# Patient Record
Sex: FEMALE | Race: WHITE | Employment: OTHER | URBAN - METROPOLITAN AREA
[De-identification: names, ages, dates, MRNs, and addresses within clinical notes are randomized per-mention and may not be internally consistent; named-entity substitution may affect disease eponyms.]

---

## 2017-01-20 RX ORDER — CALCITRIOL 0.25 UG/1
CAPSULE, LIQUID FILLED ORAL
Qty: 90 CAPSULE | Refills: 2 | Status: SHIPPED | OUTPATIENT
Start: 2017-01-20 | End: 2017-04-17 | Stop reason: SDUPTHER

## 2017-02-03 ENCOUNTER — HOSPITAL ENCOUNTER (OUTPATIENT)
Dept: MAMMOGRAPHY | Age: 42
Discharge: HOME | End: 2017-02-03
Attending: OBSTETRICS & GYNECOLOGY | Admitting: OBSTETRICS & GYNECOLOGY

## 2017-02-03 DIAGNOSIS — R92.8 ABNORMAL FINDING ON MAMMOGRAPHY: ICD-10-CM

## 2017-02-03 DIAGNOSIS — R92.2 INCONCLUSIVE MAMMOGRAM: ICD-10-CM

## 2017-03-10 RX ORDER — METOPROLOL SUCCINATE 50 MG/1
TABLET, EXTENDED RELEASE ORAL
Qty: 90 TABLET | Refills: 0 | Status: SHIPPED | OUTPATIENT
Start: 2017-03-10 | End: 2018-03-28 | Stop reason: SDUPTHER

## 2017-04-04 RX ORDER — VERAPAMIL HYDROCHLORIDE 300 MG/1
CAPSULE, EXTENDED RELEASE ORAL
Qty: 90 CAPSULE | Refills: 0 | Status: SHIPPED | OUTPATIENT
Start: 2017-04-04 | End: 2018-03-28

## 2017-04-21 RX ORDER — CALCITRIOL 0.25 UG/1
CAPSULE, LIQUID FILLED ORAL
Qty: 90 CAPSULE | Refills: 1 | Status: SHIPPED | OUTPATIENT
Start: 2017-04-21 | End: 2017-06-17 | Stop reason: SDUPTHER

## 2017-06-07 RX ORDER — METOPROLOL SUCCINATE 50 MG/1
TABLET, EXTENDED RELEASE ORAL
Qty: 90 TABLET | Refills: 0 | Status: SHIPPED | OUTPATIENT
Start: 2017-06-07 | End: 2018-03-28

## 2017-06-16 ENCOUNTER — TELEPHONE (OUTPATIENT)
Dept: FAMILY MEDICINE CLINIC | Age: 42
End: 2017-06-16

## 2017-06-16 ENCOUNTER — OFFICE VISIT (OUTPATIENT)
Dept: FAMILY MEDICINE CLINIC | Age: 42
End: 2017-06-16

## 2017-06-16 VITALS
WEIGHT: 183 LBS | HEART RATE: 78 BPM | BODY MASS INDEX: 31.41 KG/M2 | SYSTOLIC BLOOD PRESSURE: 123 MMHG | DIASTOLIC BLOOD PRESSURE: 83 MMHG | OXYGEN SATURATION: 96 % | TEMPERATURE: 97.4 F | RESPIRATION RATE: 16 BRPM

## 2017-06-16 DIAGNOSIS — K02.9 DENTAL CARIES: Primary | ICD-10-CM

## 2017-06-16 PROCEDURE — 99213 OFFICE O/P EST LOW 20 MIN: CPT | Performed by: FAMILY MEDICINE

## 2017-06-16 PROCEDURE — G8599 NO ASA/ANTIPLAT THER USE RNG: HCPCS | Performed by: FAMILY MEDICINE

## 2017-06-16 PROCEDURE — G8427 DOCREV CUR MEDS BY ELIG CLIN: HCPCS | Performed by: FAMILY MEDICINE

## 2017-06-16 PROCEDURE — 1036F TOBACCO NON-USER: CPT | Performed by: FAMILY MEDICINE

## 2017-06-16 PROCEDURE — G8419 CALC BMI OUT NRM PARAM NOF/U: HCPCS | Performed by: FAMILY MEDICINE

## 2017-06-16 RX ORDER — CLINDAMYCIN HYDROCHLORIDE 300 MG/1
300 CAPSULE ORAL 3 TIMES DAILY
Qty: 30 CAPSULE | Refills: 0 | Status: SHIPPED | OUTPATIENT
Start: 2017-06-16 | End: 2017-06-26

## 2017-06-19 RX ORDER — CALCITRIOL 0.25 UG/1
CAPSULE, LIQUID FILLED ORAL
Qty: 90 CAPSULE | Refills: 0 | Status: SHIPPED | OUTPATIENT
Start: 2017-06-19 | End: 2017-07-17 | Stop reason: SDUPTHER

## 2017-07-03 RX ORDER — VERAPAMIL HYDROCHLORIDE 300 MG/1
CAPSULE, EXTENDED RELEASE ORAL
Qty: 90 CAPSULE | Refills: 0 | Status: SHIPPED | OUTPATIENT
Start: 2017-07-03 | End: 2018-03-28

## 2017-07-17 RX ORDER — CALCITRIOL 0.25 UG/1
CAPSULE, LIQUID FILLED ORAL
Qty: 90 CAPSULE | Refills: 0 | Status: SHIPPED | OUTPATIENT
Start: 2017-07-17 | End: 2017-08-15 | Stop reason: SDUPTHER

## 2017-08-15 RX ORDER — CALCITRIOL 0.25 UG/1
CAPSULE, LIQUID FILLED ORAL
Qty: 90 CAPSULE | Refills: 0 | Status: SHIPPED | OUTPATIENT
Start: 2017-08-15 | End: 2017-09-13 | Stop reason: SDUPTHER

## 2017-08-17 DIAGNOSIS — E83.42 HYPOMAGNESEMIA: ICD-10-CM

## 2017-08-17 DIAGNOSIS — E83.51 HYPOCALCEMIA: ICD-10-CM

## 2017-08-17 DIAGNOSIS — E20.9 HYPOPARATHYROIDISM, UNSPECIFIED HYPOPARATHYROIDISM TYPE (HCC): ICD-10-CM

## 2017-08-17 LAB
CALCIUM SERPL-MCNC: 9.9 MG/DL (ref 8.3–10.6)
MAGNESIUM: 2 MG/DL (ref 1.8–2.4)

## 2017-09-05 RX ORDER — METOPROLOL SUCCINATE 50 MG/1
TABLET, EXTENDED RELEASE ORAL
Qty: 90 TABLET | Refills: 0 | Status: SHIPPED | OUTPATIENT
Start: 2017-09-05 | End: 2018-03-28

## 2017-09-13 RX ORDER — CALCITRIOL 0.25 UG/1
CAPSULE, LIQUID FILLED ORAL
Qty: 90 CAPSULE | Refills: 1 | Status: SHIPPED | OUTPATIENT
Start: 2017-09-13 | End: 2017-11-15 | Stop reason: SDUPTHER

## 2017-10-02 RX ORDER — VERAPAMIL HYDROCHLORIDE 300 MG/1
CAPSULE, EXTENDED RELEASE ORAL
Qty: 90 CAPSULE | Refills: 0 | Status: SHIPPED | OUTPATIENT
Start: 2017-10-02 | End: 2018-03-28

## 2017-11-09 ENCOUNTER — TELEPHONE (OUTPATIENT)
Dept: FAMILY MEDICINE CLINIC | Age: 42
End: 2017-11-09

## 2017-11-09 DIAGNOSIS — G91.1 OBSTRUCTIVE HYDROCEPHALUS (HCC): Primary | ICD-10-CM

## 2017-11-09 DIAGNOSIS — E20.9 HYPOPARATHYROIDISM, UNSPECIFIED HYPOPARATHYROIDISM TYPE (HCC): ICD-10-CM

## 2017-11-09 DIAGNOSIS — E83.51 HYPOCALCEMIA: ICD-10-CM

## 2017-11-10 DIAGNOSIS — E83.51 HYPOCALCEMIA: ICD-10-CM

## 2017-11-10 DIAGNOSIS — G91.1 OBSTRUCTIVE HYDROCEPHALUS (HCC): ICD-10-CM

## 2017-11-10 DIAGNOSIS — E20.9 HYPOPARATHYROIDISM, UNSPECIFIED HYPOPARATHYROIDISM TYPE (HCC): ICD-10-CM

## 2017-11-10 LAB
CALCIUM SERPL-MCNC: 6.9 MG/DL (ref 8.3–10.6)
MAGNESIUM: 1.7 MG/DL (ref 1.8–2.4)

## 2017-11-16 RX ORDER — CALCITRIOL 0.25 UG/1
CAPSULE, LIQUID FILLED ORAL
Qty: 90 CAPSULE | Refills: 0 | Status: SHIPPED | OUTPATIENT
Start: 2017-11-16 | End: 2017-12-15 | Stop reason: SDUPTHER

## 2017-12-08 RX ORDER — METOPROLOL SUCCINATE 50 MG/1
TABLET, EXTENDED RELEASE ORAL
Qty: 90 TABLET | Refills: 0 | Status: SHIPPED | OUTPATIENT
Start: 2017-12-08 | End: 2018-03-28

## 2017-12-15 RX ORDER — CALCITRIOL 0.25 UG/1
CAPSULE, LIQUID FILLED ORAL
Qty: 90 CAPSULE | Refills: 0 | Status: SHIPPED | OUTPATIENT
Start: 2017-12-15 | End: 2018-01-13 | Stop reason: SDUPTHER

## 2017-12-29 RX ORDER — VERAPAMIL HYDROCHLORIDE 300 MG/1
CAPSULE, EXTENDED RELEASE ORAL
Qty: 90 CAPSULE | Refills: 0 | Status: SHIPPED | OUTPATIENT
Start: 2017-12-29 | End: 2018-03-28

## 2018-01-15 RX ORDER — CALCITRIOL 0.25 UG/1
CAPSULE, LIQUID FILLED ORAL
Qty: 90 CAPSULE | Refills: 0 | Status: SHIPPED | OUTPATIENT
Start: 2018-01-15 | End: 2018-02-11 | Stop reason: SDUPTHER

## 2018-02-12 RX ORDER — CALCITRIOL 0.25 UG/1
CAPSULE, LIQUID FILLED ORAL
Qty: 90 CAPSULE | Refills: 0 | Status: SHIPPED | OUTPATIENT
Start: 2018-02-12 | End: 2018-03-15 | Stop reason: SDUPTHER

## 2018-03-06 RX ORDER — METOPROLOL SUCCINATE 50 MG/1
TABLET, EXTENDED RELEASE ORAL
Qty: 90 TABLET | Refills: 0 | Status: SHIPPED | OUTPATIENT
Start: 2018-03-06 | End: 2018-03-28

## 2018-03-15 RX ORDER — CALCITRIOL 0.25 UG/1
CAPSULE, LIQUID FILLED ORAL
Qty: 90 CAPSULE | Refills: 0 | Status: SHIPPED | OUTPATIENT
Start: 2018-03-15 | End: 2018-03-28

## 2018-03-28 ENCOUNTER — OFFICE VISIT (OUTPATIENT)
Dept: FAMILY MEDICINE CLINIC | Age: 43
End: 2018-03-28

## 2018-03-28 VITALS
WEIGHT: 192.6 LBS | HEIGHT: 65 IN | HEART RATE: 76 BPM | DIASTOLIC BLOOD PRESSURE: 83 MMHG | RESPIRATION RATE: 16 BRPM | SYSTOLIC BLOOD PRESSURE: 121 MMHG | BODY MASS INDEX: 32.09 KG/M2 | TEMPERATURE: 97.6 F | OXYGEN SATURATION: 95 %

## 2018-03-28 DIAGNOSIS — I67.1 CEREBRAL ANEURYSM: Primary | ICD-10-CM

## 2018-03-28 DIAGNOSIS — E83.42 HYPOMAGNESEMIA: ICD-10-CM

## 2018-03-28 DIAGNOSIS — I10 HYPERTENSION, ESSENTIAL, BENIGN: ICD-10-CM

## 2018-03-28 DIAGNOSIS — I63.9 CEREBRAL INFARCTION, UNSPECIFIED MECHANISM (HCC): ICD-10-CM

## 2018-03-28 DIAGNOSIS — E20.9 HYPOPARATHYROIDISM, UNSPECIFIED HYPOPARATHYROIDISM TYPE (HCC): ICD-10-CM

## 2018-03-28 DIAGNOSIS — I69.90 LATE EFFECTS OF CVA (CEREBROVASCULAR ACCIDENT): ICD-10-CM

## 2018-03-28 DIAGNOSIS — R47.01 EXPRESSIVE APHASIA: ICD-10-CM

## 2018-03-28 DIAGNOSIS — Z12.39 SCREENING FOR BREAST CANCER: ICD-10-CM

## 2018-03-28 LAB
A/G RATIO: 1.5 (ref 1.1–2.2)
ALBUMIN SERPL-MCNC: 4.5 G/DL (ref 3.4–5)
ALP BLD-CCNC: 72 U/L (ref 40–129)
ALT SERPL-CCNC: 9 U/L (ref 10–40)
ANION GAP SERPL CALCULATED.3IONS-SCNC: 18 MMOL/L (ref 3–16)
AST SERPL-CCNC: 9 U/L (ref 15–37)
BILIRUB SERPL-MCNC: 0.5 MG/DL (ref 0–1)
BUN BLDV-MCNC: 24 MG/DL (ref 7–20)
CALCIUM SERPL-MCNC: 8.1 MG/DL (ref 8.3–10.6)
CHLORIDE BLD-SCNC: 97 MMOL/L (ref 99–110)
CHOLESTEROL, TOTAL: 216 MG/DL (ref 0–199)
CO2: 27 MMOL/L (ref 21–32)
CREAT SERPL-MCNC: 1.2 MG/DL (ref 0.6–1.1)
GFR AFRICAN AMERICAN: 59
GFR NON-AFRICAN AMERICAN: 49
GLOBULIN: 3.1 G/DL
GLUCOSE BLD-MCNC: 79 MG/DL (ref 70–99)
HCT VFR BLD CALC: 43.1 % (ref 36–48)
HDLC SERPL-MCNC: 58 MG/DL (ref 40–60)
HEMOGLOBIN: 14.7 G/DL (ref 12–16)
LDL CHOLESTEROL CALCULATED: 123 MG/DL
MAGNESIUM: 1.9 MG/DL (ref 1.8–2.4)
MCH RBC QN AUTO: 29.5 PG (ref 26–34)
MCHC RBC AUTO-ENTMCNC: 34 G/DL (ref 31–36)
MCV RBC AUTO: 86.8 FL (ref 80–100)
PDW BLD-RTO: 13.3 % (ref 12.4–15.4)
PLATELET # BLD: 243 K/UL (ref 135–450)
PMV BLD AUTO: 10 FL (ref 5–10.5)
POTASSIUM SERPL-SCNC: 4.5 MMOL/L (ref 3.5–5.1)
RBC # BLD: 4.97 M/UL (ref 4–5.2)
SODIUM BLD-SCNC: 142 MMOL/L (ref 136–145)
TOTAL PROTEIN: 7.6 G/DL (ref 6.4–8.2)
TRIGL SERPL-MCNC: 173 MG/DL (ref 0–150)
TSH SERPL DL<=0.05 MIU/L-ACNC: 1.29 UIU/ML (ref 0.27–4.2)
VLDLC SERPL CALC-MCNC: 35 MG/DL
WBC # BLD: 8.6 K/UL (ref 4–11)

## 2018-03-28 PROCEDURE — 1036F TOBACCO NON-USER: CPT | Performed by: FAMILY MEDICINE

## 2018-03-28 PROCEDURE — G8427 DOCREV CUR MEDS BY ELIG CLIN: HCPCS | Performed by: FAMILY MEDICINE

## 2018-03-28 PROCEDURE — 99214 OFFICE O/P EST MOD 30 MIN: CPT | Performed by: FAMILY MEDICINE

## 2018-03-28 PROCEDURE — G8599 NO ASA/ANTIPLAT THER USE RNG: HCPCS | Performed by: FAMILY MEDICINE

## 2018-03-28 PROCEDURE — G8417 CALC BMI ABV UP PARAM F/U: HCPCS | Performed by: FAMILY MEDICINE

## 2018-03-28 PROCEDURE — G8484 FLU IMMUNIZE NO ADMIN: HCPCS | Performed by: FAMILY MEDICINE

## 2018-03-28 RX ORDER — LEVETIRACETAM 250 MG/1
250 TABLET ORAL DAILY
Qty: 30 TABLET | Refills: 5 | Status: SHIPPED | OUTPATIENT
Start: 2018-03-28 | End: 2018-04-28 | Stop reason: SDUPTHER

## 2018-03-28 RX ORDER — VERAPAMIL HYDROCHLORIDE 300 MG/1
CAPSULE, EXTENDED RELEASE ORAL
Qty: 90 CAPSULE | Refills: 1 | Status: SHIPPED | OUTPATIENT
Start: 2018-03-28 | End: 2018-09-25 | Stop reason: SDUPTHER

## 2018-03-28 RX ORDER — METOPROLOL SUCCINATE 50 MG/1
TABLET, EXTENDED RELEASE ORAL
Qty: 90 TABLET | Refills: 1 | Status: SHIPPED | OUTPATIENT
Start: 2018-03-28 | End: 2019-05-08 | Stop reason: SDUPTHER

## 2018-03-28 ASSESSMENT — PATIENT HEALTH QUESTIONNAIRE - PHQ9
SUM OF ALL RESPONSES TO PHQ QUESTIONS 1-9: 0
SUM OF ALL RESPONSES TO PHQ9 QUESTIONS 1 & 2: 0
1. LITTLE INTEREST OR PLEASURE IN DOING THINGS: 0
2. FEELING DOWN, DEPRESSED OR HOPELESS: 0

## 2018-03-28 NOTE — PROGRESS NOTES
Here for f/u, pt overall has been doing ok, things are stable. Everything is in the status quo per pt and . Pt is home during the day, was doing some adult  in the past but has not been doing that regularly. Pt is home, trying to stay active and doing some household chores and manages to stay busy. Does watch a lot of TV, and plays some games. Pt at this point is comfortable at home, and is not interested. She has been feeling well overall, no new issues or concerns at this time. Pt has not had any issues of chest pain, shortness of breath. No abd pain, nausea issues.  does watch her closely for calcium balance, and does get bloodwork regularly, does need bloodwork orders to have done. Appetite has been ok and energy ok. She has not seen dr. Verona Abbott recently, as she has 'graduated' and gets yearly contrast-enhanced CT scan. Last CT angiogram was 1 year ago. No new neurologic symptoms, continues on keppra and other meds and feels well. Except as noted above in the history of present illness, the review of systems is  negative for headache, vision changes, chest pain, shortness of breath, abdominal pain, urinary sx, bowel changes. Past medical, surgical, and social history reviewed and updated  Medications and allergies reviewed and updated         O: /83   Pulse 76   Temp 97.6 °F (36.4 °C)   Resp 16   Ht 5' 5\" (1.651 m)   Wt 192 lb 9.6 oz (87.4 kg)   SpO2 95%   Breastfeeding? No   BMI 32.05 kg/m²   GEN: No acute distress, cooperative, well nourished, alert. HEENT: PEERLA, EOMI , normocephalic/atraumatic, nares and oropharynx clear. Mucus membranes normal, Tympanic membranes clear bilaterally. Neck: soft, supple, no thyromegaly, mass, no Lymphadenopathy  CV: Regular rate and rhythm, no murmur, rubs, gallops. No edema. Resp: Clear to auscultation bilaterally good air entry bilaterally  No crackles, wheeze. Breathing comfortably.    Neuro: No focal Future    Quality & Risk Score Accuracy - MEDICARE ADVANTAGE    Last edited 03/28/18 20:09 EDT by Elsie Beasley MD            Follow-up appointment:   3 mos/pending bloodwork results/prn    Discussed use, benefit, and side effects of all prescribed medications. Barriers to medication compliance addressed. All patient questions answered. Pt voiced understanding. When applicable, patient's outside records were reviewed through [facility]. The patient has signed appropriate paperworks/consents. Dragon dictation software was used for parts of this progress note. All attempts were made to correct any errors and ensure accuracy.

## 2018-03-28 NOTE — LETTER
[facility]  [address] [address]  Phone: [phone]  Fax: [phone]    Andrea Gregorio MD        March 28, 2018     Patient: Nathanael Jeter   YOB: 1975   Date of Visit: 3/28/2018       To Whom It May Concern: It is my medical opinion that Yoana Frankel requires a disability parking placard for the following reasons:  She cannot walk without assistance from another person or the use of an assistance device (cane, crutch, prosthetic device, wheelchair, etc.). She has limited walking ability due to a neurologic condition. Duration of need: permanent    If you have any questions or concerns, please don't hesitate to call.     Sincerely,        Andrea Gregorio MD

## 2018-03-30 RX ORDER — VERAPAMIL HYDROCHLORIDE 300 MG/1
CAPSULE, EXTENDED RELEASE ORAL
Qty: 90 CAPSULE | Refills: 0 | Status: SHIPPED | OUTPATIENT
Start: 2018-03-30 | End: 2019-05-08 | Stop reason: SDUPTHER

## 2018-04-30 RX ORDER — LEVETIRACETAM 250 MG/1
250 TABLET ORAL DAILY
Qty: 90 TABLET | Refills: 2 | Status: SHIPPED | OUTPATIENT
Start: 2018-04-30 | End: 2019-02-08 | Stop reason: SDUPTHER

## 2018-05-18 RX ORDER — CALCITRIOL 0.25 UG/1
CAPSULE, LIQUID FILLED ORAL
Qty: 270 CAPSULE | Refills: 5 | Status: SHIPPED | OUTPATIENT
Start: 2018-05-18 | End: 2019-05-08 | Stop reason: SDUPTHER

## 2018-06-07 RX ORDER — METOPROLOL SUCCINATE 50 MG/1
TABLET, EXTENDED RELEASE ORAL
Qty: 90 TABLET | Refills: 0 | Status: SHIPPED | OUTPATIENT
Start: 2018-06-07 | End: 2019-05-08

## 2018-07-13 ENCOUNTER — TELEPHONE (OUTPATIENT)
Dept: FAMILY MEDICINE CLINIC | Age: 43
End: 2018-07-13

## 2018-09-14 ENCOUNTER — OFFICE VISIT (OUTPATIENT)
Dept: FAMILY MEDICINE CLINIC | Age: 43
End: 2018-09-14

## 2018-09-14 VITALS
RESPIRATION RATE: 14 BRPM | SYSTOLIC BLOOD PRESSURE: 122 MMHG | TEMPERATURE: 98.3 F | BODY MASS INDEX: 31.72 KG/M2 | DIASTOLIC BLOOD PRESSURE: 79 MMHG | HEART RATE: 73 BPM | OXYGEN SATURATION: 93 % | WEIGHT: 190.6 LBS

## 2018-09-14 DIAGNOSIS — E83.42 HYPOMAGNESEMIA: ICD-10-CM

## 2018-09-14 DIAGNOSIS — I10 HYPERTENSION, ESSENTIAL, BENIGN: ICD-10-CM

## 2018-09-14 DIAGNOSIS — R10.30 LOWER ABDOMINAL PAIN: ICD-10-CM

## 2018-09-14 DIAGNOSIS — I69.90 LATE EFFECTS OF CVA (CEREBROVASCULAR ACCIDENT): ICD-10-CM

## 2018-09-14 DIAGNOSIS — I63.9 CEREBRAL INFARCTION, UNSPECIFIED MECHANISM (HCC): Primary | ICD-10-CM

## 2018-09-14 LAB
A/G RATIO: 1.5 (ref 1.1–2.2)
ALBUMIN SERPL-MCNC: 4.5 G/DL (ref 3.4–5)
ALP BLD-CCNC: 62 U/L (ref 40–129)
ALT SERPL-CCNC: 8 U/L (ref 10–40)
ANION GAP SERPL CALCULATED.3IONS-SCNC: 18 MMOL/L (ref 3–16)
AST SERPL-CCNC: 10 U/L (ref 15–37)
BACTERIA: ABNORMAL /HPF
BILIRUB SERPL-MCNC: 0.3 MG/DL (ref 0–1)
BILIRUBIN URINE: NEGATIVE
BLOOD, URINE: ABNORMAL
BUN BLDV-MCNC: 15 MG/DL (ref 7–20)
CALCIUM SERPL-MCNC: 8.1 MG/DL (ref 8.3–10.6)
CHLORIDE BLD-SCNC: 100 MMOL/L (ref 99–110)
CLARITY: ABNORMAL
CO2: 25 MMOL/L (ref 21–32)
COLOR: YELLOW
CREAT SERPL-MCNC: 1 MG/DL (ref 0.6–1.1)
EPITHELIAL CELLS, UA: 1 /HPF (ref 0–5)
GFR AFRICAN AMERICAN: >60
GFR NON-AFRICAN AMERICAN: 60
GLOBULIN: 3 G/DL
GLUCOSE BLD-MCNC: 79 MG/DL (ref 70–99)
GLUCOSE URINE: NEGATIVE MG/DL
HCT VFR BLD CALC: 41.6 % (ref 36–48)
HEMOGLOBIN: 13.9 G/DL (ref 12–16)
HYALINE CASTS: 1 /LPF (ref 0–8)
KETONES, URINE: NEGATIVE MG/DL
LEUKOCYTE ESTERASE, URINE: ABNORMAL
MAGNESIUM: 1.9 MG/DL (ref 1.8–2.4)
MCH RBC QN AUTO: 29.5 PG (ref 26–34)
MCHC RBC AUTO-ENTMCNC: 33.5 G/DL (ref 31–36)
MCV RBC AUTO: 88.1 FL (ref 80–100)
MICROSCOPIC EXAMINATION: YES
NITRITE, URINE: POSITIVE
PDW BLD-RTO: 13.9 % (ref 12.4–15.4)
PH UA: 7
PLATELET # BLD: 233 K/UL (ref 135–450)
PMV BLD AUTO: 10.4 FL (ref 5–10.5)
POTASSIUM SERPL-SCNC: 4.2 MMOL/L (ref 3.5–5.1)
PROTEIN UA: NEGATIVE MG/DL
RBC # BLD: 4.72 M/UL (ref 4–5.2)
RBC UA: 1 /HPF (ref 0–4)
SODIUM BLD-SCNC: 143 MMOL/L (ref 136–145)
SPECIFIC GRAVITY UA: 1.01
TOTAL PROTEIN: 7.5 G/DL (ref 6.4–8.2)
TSH SERPL DL<=0.05 MIU/L-ACNC: 1.03 UIU/ML (ref 0.27–4.2)
URINE TYPE: ABNORMAL
UROBILINOGEN, URINE: 0.2 E.U./DL
WBC # BLD: 8 K/UL (ref 4–11)
WBC UA: 228 /HPF (ref 0–5)

## 2018-09-14 PROCEDURE — G8427 DOCREV CUR MEDS BY ELIG CLIN: HCPCS | Performed by: FAMILY MEDICINE

## 2018-09-14 PROCEDURE — G8417 CALC BMI ABV UP PARAM F/U: HCPCS | Performed by: FAMILY MEDICINE

## 2018-09-14 PROCEDURE — G8599 NO ASA/ANTIPLAT THER USE RNG: HCPCS | Performed by: FAMILY MEDICINE

## 2018-09-14 PROCEDURE — 1036F TOBACCO NON-USER: CPT | Performed by: FAMILY MEDICINE

## 2018-09-14 PROCEDURE — 99214 OFFICE O/P EST MOD 30 MIN: CPT | Performed by: FAMILY MEDICINE

## 2018-09-14 NOTE — PROGRESS NOTES
Here for a follow up and recheck of her blood pressure and some issues or concerns of some abd 'weird' feeling. Pt does not have any symptoms currently seems to come and go but feels 'off'. She has been complaining of some intermittent sx to her  a few days ago. No true pain but she claims it feels 'weird'. Not having any urinary sx, no frequency, no dysuria. Pt has had chronic bowel and bladder issues related to her prior CVA, but has not had any issues with nocturia. Does get prompted to go to the bathroom every 2 hrs on average. No blood in bowels, no c/o dysuria. Pt does not have any cycles since her D+C/ablation 4 yrs ago. No nausea/vomiting. No fevers    Pt here for follow up of blood pressure. Pt states doing great with adherence to therapy and feels well. No issues of chest pain, shortness of breath. No vision changes, headache, swelling in legs. Pt was going to adult  but not recently due to cost and transportation issues. Is at the house most of the day, will stay active folding clothes and doing chores, watching TV. Except as noted above in the history of present illness, the review of systems is  negative for headache, vision changes, chest pain, shortness of breath, abdominal pain, urinary sx, bowel changes. Past medical, surgical, and social history reviewed and updated  Medications and allergies reviewed and updated         O: /79   Pulse 73   Temp 98.3 °F (36.8 °C) (Oral)   Resp 14   Wt 190 lb 9.6 oz (86.5 kg)   SpO2 93%   BMI 31.72 kg/m²   GEN: No acute distress, cooperative, well nourished, alert. HEENT: PEERLA, EOMI , normocephalic/atraumatic, nares and oropharynx clear. Mucus membranes normal, Tympanic membranes clear bilaterally. Neck: soft, supple, no thyromegaly, mass, no Lymphadenopathy  CV: Regular rate and rhythm, no murmur, rubs, gallops. No edema.   Resp: Clear to auscultation bilaterally good air entry bilaterally  No crackles, wheeze. Breathing comfortably. Abd: soft, nontender. normoactive bowels sounds. No hepatosplenomegaly  No masses  Ext: no clubbing, cyanosis, edema  Psych: mood stable, flattened affect. No suicidal thoughts or ideation       Current Outpatient Prescriptions   Medication Sig Dispense Refill    MAGNESIUM-OXIDE 400 (241.3 Mg) MG TABS tablet TAKE 1 TABLET BY MOUTH TWICE DAILY. FOLLOW UP APPOINTMENT 180 tablet 0    levETIRAcetam (KEPPRA) 250 MG tablet TAKE 1 TABLET BY MOUTH DAILY 90 tablet 2    metoprolol succinate (TOPROL XL) 50 MG extended release tablet TAKE ONE TABLET BY MOUTH EVERY DAY 90 tablet 1    Verapamil HCl  MG CP24 TAKE 1 CAPSULE BY MOUTH EVERY DAY 90 capsule 1    calcitRIOL (ROCALTROL) 0.25 MCG capsule TAKE ONE CAPSULE BY MOUTH EVERY MORNING AND TWO CAPSULES EVERY EVENING 90 capsule 0    calcium carbonate 600 MG TABS tablet Take 1 tablet by mouth daily.  sulfamethoxazole-trimethoprim (BACTRIM DS) 800-160 MG per tablet Take 1 tablet by mouth 2 times daily for 7 days 14 tablet 0    metoprolol succinate (TOPROL XL) 50 MG extended release tablet TAKE ONE TABLET BY MOUTH EVERY DAY 90 tablet 0    calcitRIOL (ROCALTROL) 0.25 MCG capsule TAKE ONE CAPSULE BY MOUTH EVERY MORNING AND TWO CAPSULES EVERY EVENING 270 capsule 5    Verapamil HCl  MG CP24 TAKE 1 CAPSULE BY MOUTH EVERY DAY 90 capsule 0     No current facility-administered medications for this visit. ASSESSMENT / PLAN:    1. Cerebral infarction, unspecified mechanism ([facility] [address]  Chronic asymptomatic  Cont max medical therapy and will monitor, f/u with neurosurgery    2. Hypomagnesemia  Cont supplementation and check bloodwork as below    3. Late effects of CVA (cerebrovascular accident)  As above    4. Hypertension, essential, benign  . Blood pressure stable @ goal controlled  - CBC; Future  - Comprehensive Metabolic Panel; Future  - MAGNESIUM; Future  - TSH without Reflex; Future    5.  Lower abdominal pain  Exam nonfocal  Suspect UTI, unable to give sample  Check through lab  Management pending results. - Urinalysis; Future           Follow-up appointment:   Pending bloodwork results    Discussed use, benefit, and side effects of all prescribed medications. Barriers to medication compliance addressed. All patient questions answered. Pt voiced understanding. When applicable, patient's outside records were reviewed through [address] [place] [address]. The patient has signed appropriate paperworks/consents. Dragon dictation software was used for parts of this progress note. All attempts were made to correct any errors and ensure accuracy.

## 2018-09-15 RX ORDER — SULFAMETHOXAZOLE AND TRIMETHOPRIM 800; 160 MG/1; MG/1
1 TABLET ORAL 2 TIMES DAILY
Qty: 14 TABLET | Refills: 0 | Status: SHIPPED | OUTPATIENT
Start: 2018-09-15 | End: 2018-09-22

## 2018-09-25 RX ORDER — VERAPAMIL HYDROCHLORIDE 300 MG/1
CAPSULE, EXTENDED RELEASE ORAL
Qty: 90 CAPSULE | Refills: 0 | Status: SHIPPED | OUTPATIENT
Start: 2018-09-25 | End: 2018-12-24 | Stop reason: SDUPTHER

## 2018-11-30 RX ORDER — METOPROLOL SUCCINATE 50 MG/1
TABLET, EXTENDED RELEASE ORAL
Qty: 90 TABLET | Refills: 1 | Status: SHIPPED | OUTPATIENT
Start: 2018-11-30 | End: 2019-05-08

## 2018-12-07 DIAGNOSIS — N39.0 ACUTE UTI: ICD-10-CM

## 2018-12-07 DIAGNOSIS — E20.9 HYPOPARATHYROIDISM, UNSPECIFIED HYPOPARATHYROIDISM TYPE (HCC): ICD-10-CM

## 2018-12-07 LAB
CALCIUM SERPL-MCNC: 11.8 MG/DL (ref 8.3–10.6)
MAGNESIUM: 2.2 MG/DL (ref 1.8–2.4)

## 2018-12-10 LAB
BACTERIA: ABNORMAL /HPF
BILIRUBIN URINE: NEGATIVE
BLOOD, URINE: NEGATIVE
CLARITY: ABNORMAL
COLOR: YELLOW
EPITHELIAL CELLS, UA: ABNORMAL /HPF
GLUCOSE URINE: NEGATIVE MG/DL
KETONES, URINE: NEGATIVE MG/DL
LEUKOCYTE ESTERASE, URINE: ABNORMAL
MICROSCOPIC EXAMINATION: YES
NITRITE, URINE: NEGATIVE
PH UA: 6
PROTEIN UA: NEGATIVE MG/DL
RBC UA: ABNORMAL /HPF (ref 0–2)
SPECIFIC GRAVITY UA: 1.01
URINE TYPE: ABNORMAL
UROBILINOGEN, URINE: 0.2 E.U./DL
WBC UA: ABNORMAL /HPF (ref 0–5)

## 2018-12-12 LAB
ORGANISM: ABNORMAL
URINE CULTURE, ROUTINE: ABNORMAL
URINE CULTURE, ROUTINE: ABNORMAL

## 2018-12-14 RX ORDER — NITROFURANTOIN 25; 75 MG/1; MG/1
100 CAPSULE ORAL 2 TIMES DAILY
Qty: 14 CAPSULE | Refills: 0 | Status: SHIPPED | OUTPATIENT
Start: 2018-12-14 | End: 2018-12-21

## 2018-12-24 RX ORDER — VERAPAMIL HYDROCHLORIDE 300 MG/1
CAPSULE, EXTENDED RELEASE ORAL
Qty: 90 CAPSULE | Refills: 0 | Status: SHIPPED | OUTPATIENT
Start: 2018-12-24 | End: 2019-03-23 | Stop reason: SDUPTHER

## 2019-01-02 DIAGNOSIS — E20.9 HYPOPARATHYROIDISM, UNSPECIFIED HYPOPARATHYROIDISM TYPE (HCC): ICD-10-CM

## 2019-01-02 LAB
CALCIUM SERPL-MCNC: 8.4 MG/DL (ref 8.3–10.6)
MAGNESIUM: 1.7 MG/DL (ref 1.8–2.4)

## 2019-02-08 RX ORDER — LEVETIRACETAM 250 MG/1
250 TABLET ORAL DAILY
Qty: 90 TABLET | Refills: 0 | Status: SHIPPED | OUTPATIENT
Start: 2019-02-08 | End: 2019-05-08 | Stop reason: SDUPTHER

## 2019-03-25 RX ORDER — VERAPAMIL HYDROCHLORIDE 300 MG/1
CAPSULE, EXTENDED RELEASE ORAL
Qty: 90 CAPSULE | Refills: 1 | Status: SHIPPED | OUTPATIENT
Start: 2019-03-25 | End: 2019-09-12 | Stop reason: SDUPTHER

## 2019-05-08 ENCOUNTER — HOSPITAL ENCOUNTER (OUTPATIENT)
Dept: GENERAL RADIOLOGY | Age: 44
Discharge: HOME | End: 2019-05-08
Payer: COMMERCIAL

## 2019-05-08 ENCOUNTER — OFFICE VISIT (OUTPATIENT)
Dept: FAMILY MEDICINE CLINIC | Age: 44
End: 2019-05-08
Payer: COMMERCIAL

## 2019-05-08 ENCOUNTER — HOSPITAL ENCOUNTER (OUTPATIENT)
Age: 44
Discharge: HOME | End: 2019-05-08
Payer: COMMERCIAL

## 2019-05-08 VITALS
WEIGHT: 180.5 LBS | RESPIRATION RATE: 20 BRPM | OXYGEN SATURATION: 95 % | SYSTOLIC BLOOD PRESSURE: 128 MMHG | DIASTOLIC BLOOD PRESSURE: 86 MMHG | BODY MASS INDEX: 30.04 KG/M2 | TEMPERATURE: 98.7 F | HEART RATE: 63 BPM

## 2019-05-08 DIAGNOSIS — I69.90 LATE EFFECTS OF CVA (CEREBROVASCULAR ACCIDENT): ICD-10-CM

## 2019-05-08 DIAGNOSIS — E20.9 HYPOPARATHYROIDISM, UNSPECIFIED HYPOPARATHYROIDISM TYPE (HCC): ICD-10-CM

## 2019-05-08 DIAGNOSIS — I10 HYPERTENSION, ESSENTIAL, BENIGN: ICD-10-CM

## 2019-05-08 DIAGNOSIS — I63.9 CEREBRAL INFARCTION, UNSPECIFIED MECHANISM (HCC): Primary | ICD-10-CM

## 2019-05-08 DIAGNOSIS — E83.42 HYPOMAGNESEMIA: ICD-10-CM

## 2019-05-08 DIAGNOSIS — E83.51 HYPOCALCEMIA: ICD-10-CM

## 2019-05-08 DIAGNOSIS — R47.9 SPEECH DEFECT: ICD-10-CM

## 2019-05-08 DIAGNOSIS — M25.571 ACUTE RIGHT ANKLE PAIN: ICD-10-CM

## 2019-05-08 DIAGNOSIS — R41.844 EXECUTIVE FUNCTION DEFICIT: ICD-10-CM

## 2019-05-08 LAB
A/G RATIO: 1.3 (ref 1.1–2.2)
ALBUMIN SERPL-MCNC: 4.4 G/DL (ref 3.4–5)
ALP BLD-CCNC: 73 U/L (ref 40–129)
ALT SERPL-CCNC: 7 U/L (ref 10–40)
ANION GAP SERPL CALCULATED.3IONS-SCNC: 14 MMOL/L (ref 3–16)
AST SERPL-CCNC: 8 U/L (ref 15–37)
BILIRUB SERPL-MCNC: 0.5 MG/DL (ref 0–1)
BUN BLDV-MCNC: 24 MG/DL (ref 7–20)
CALCIUM SERPL-MCNC: 8 MG/DL (ref 8.3–10.6)
CHLORIDE BLD-SCNC: 102 MMOL/L (ref 99–110)
CHOLESTEROL, TOTAL: 217 MG/DL (ref 0–199)
CO2: 25 MMOL/L (ref 21–32)
CREAT SERPL-MCNC: 1.2 MG/DL (ref 0.6–1.1)
GFR AFRICAN AMERICAN: 59
GFR NON-AFRICAN AMERICAN: 48
GLOBULIN: 3.4 G/DL
GLUCOSE BLD-MCNC: 94 MG/DL (ref 70–99)
HCT VFR BLD CALC: 43.4 % (ref 36–48)
HDLC SERPL-MCNC: 51 MG/DL (ref 40–60)
HEMOGLOBIN: 14.5 G/DL (ref 12–16)
LDL CHOLESTEROL CALCULATED: 144 MG/DL
MAGNESIUM: 1.9 MG/DL (ref 1.8–2.4)
MCH RBC QN AUTO: 29.6 PG (ref 26–34)
MCHC RBC AUTO-ENTMCNC: 33.4 G/DL (ref 31–36)
MCV RBC AUTO: 88.7 FL (ref 80–100)
PDW BLD-RTO: 14.1 % (ref 12.4–15.4)
PLATELET # BLD: 251 K/UL (ref 135–450)
PMV BLD AUTO: 10.2 FL (ref 5–10.5)
POTASSIUM SERPL-SCNC: 4.2 MMOL/L (ref 3.5–5.1)
RBC # BLD: 4.89 M/UL (ref 4–5.2)
SODIUM BLD-SCNC: 141 MMOL/L (ref 136–145)
TOTAL PROTEIN: 7.8 G/DL (ref 6.4–8.2)
TRIGL SERPL-MCNC: 110 MG/DL (ref 0–150)
TSH SERPL DL<=0.05 MIU/L-ACNC: 0.96 UIU/ML (ref 0.27–4.2)
VLDLC SERPL CALC-MCNC: 22 MG/DL
WBC # BLD: 7.3 K/UL (ref 4–11)

## 2019-05-08 PROCEDURE — 73610 X-RAY EXAM OF ANKLE: CPT

## 2019-05-08 PROCEDURE — G8599 NO ASA/ANTIPLAT THER USE RNG: HCPCS | Performed by: FAMILY MEDICINE

## 2019-05-08 PROCEDURE — G8427 DOCREV CUR MEDS BY ELIG CLIN: HCPCS | Performed by: FAMILY MEDICINE

## 2019-05-08 PROCEDURE — 1036F TOBACCO NON-USER: CPT | Performed by: FAMILY MEDICINE

## 2019-05-08 PROCEDURE — G8417 CALC BMI ABV UP PARAM F/U: HCPCS | Performed by: FAMILY MEDICINE

## 2019-05-08 PROCEDURE — 99214 OFFICE O/P EST MOD 30 MIN: CPT | Performed by: FAMILY MEDICINE

## 2019-05-08 RX ORDER — LEVETIRACETAM 250 MG/1
250 TABLET ORAL DAILY
Qty: 90 TABLET | Refills: 0 | Status: SHIPPED | OUTPATIENT
Start: 2019-05-08 | End: 2019-08-03 | Stop reason: SDUPTHER

## 2019-05-08 RX ORDER — VERAPAMIL HYDROCHLORIDE 300 MG/1
CAPSULE, EXTENDED RELEASE ORAL
Qty: 90 CAPSULE | Refills: 1 | Status: SHIPPED | OUTPATIENT
Start: 2019-05-08 | End: 2019-09-12

## 2019-05-08 RX ORDER — CALCITRIOL 0.25 UG/1
CAPSULE, LIQUID FILLED ORAL
Qty: 270 CAPSULE | Refills: 1 | Status: SHIPPED | OUTPATIENT
Start: 2019-05-08 | End: 2019-10-15 | Stop reason: SDUPTHER

## 2019-05-08 RX ORDER — METOPROLOL SUCCINATE 50 MG/1
TABLET, EXTENDED RELEASE ORAL
Qty: 90 TABLET | Refills: 1 | Status: SHIPPED | OUTPATIENT
Start: 2019-05-08 | End: 2019-10-15 | Stop reason: SDUPTHER

## 2019-05-08 NOTE — PROGRESS NOTES
Here for f/u and recheck of mood, fuctional status, blood pressure, s/p CVA function    Pt is spending most of the time at home, and does spend a lot of time playing games on facebook. Pt does some laundry and some housework. Pt spends most times at home, and  is at work. Pt feels ok during the day at home,  does not have any sig concerns about her being there by herself. Pt has struggled to find some support for her disabilities. She was doing some day programs in the past, but was hard on  based on her schedule and they have not done that regularly. Pt does see neurosurgery regularly at  with dr. Clau Quinn, does imaging through [facility]. Last CT scan was 1/2017. The goal was to have them yearly but hopefully has 'graduated' to a 5 year plan. No issues of chest pain, shortness of breath. No abd pain, shortness of breath. Pt has noted some pain to R foot. Does not have any swelling . Pt thinks she may have injured but doesn't necessarily remember. The pain is in the R foot only. Pt here for follow up of blood pressure. Pt states doing great with adherence to therapy and feels well. No issues of chest pain, shortness of breath. No vision changes, headache, swelling in legs. Except as noted above in the history of present illness, the review of systems is  negative for headache, vision changes, chest pain, shortness of breath, abdominal pain, urinary sx, bowel changes. Past medical, surgical, and social history reviewed and updated  Medications and allergies reviewed and updated         O: /86   Pulse 63   Temp 98.7 °F (37.1 °C) (Oral)   Resp 20   Wt 180 lb 8 oz (81.9 kg)   SpO2 95%   BMI 30.04 kg/m²   GEN: No acute distress, cooperative, well nourished, alert. HEENT: PEERLA, EOMI , normocephalic/atraumatic, nares and oropharynx clear. Mucous membranes normal, Tympanic membranes clear bilaterally.     Neck: soft, supple, no thyromegaly, mass, no Lymphadenopathy  CV: Regular rate and rhythm, no murmur, rubs, gallops. No edema. Resp: Clear to auscultation bilaterally good air entry bilaterally  No crackles, wheeze. Breathing comfortably. Psych: mood stable, answering some questions. Denies depression        Current Outpatient Medications   Medication Sig Dispense Refill    magnesium oxide (MAGNESIUM-OXIDE) 400 (241.3 Mg) MG TABS tablet TAKE 1 TABLET BY MOUTH TWICE DAILY 180 tablet 1    levETIRAcetam (KEPPRA) 250 MG tablet Take 1 tablet by mouth daily 90 tablet 0    calcitRIOL (ROCALTROL) 0.25 MCG capsule TAKE ONE CAPSULE BY MOUTH EVERY MORNING AND TWO CAPSULES EVERY EVENING 270 capsule 1    Verapamil HCl  MG CP24 TAKE 1 CAPSULE BY MOUTH EVERY DAY 90 capsule 1    metoprolol succinate (TOPROL XL) 50 MG extended release tablet TAKE ONE TABLET BY MOUTH EVERY DAY 90 tablet 1    calcium carbonate 600 MG TABS tablet Take 1 tablet by mouth daily.  Verapamil HCl  MG CP24 TAKE 1 CAPSULE BY MOUTH EVERY DAY 90 capsule 1     No current facility-administered medications for this visit. ASSESSMENT / PLAN:    1. Cerebral infarction, unspecified mechanism ([address]  S/p aneurysm  Asymptomatic, cont monitoring  Cont f/u with neurosurgery and f/u ASAP for new neurologic sx    2. Hypoparathyroidism, unspecified hypoparathyroidism type ([facility] [address]  Cont monitoring of calcium level  - CBC; Future  - Comprehensive Metabolic Panel; Future    3. Hypomagnesemia  - MAGNESIUM; Future    4. Late effects of CVA (cerebrovascular accident)  Stable  Encouraged pt take part in activities as tolerated, and encouraged  to try and get out as much as possible    5. Speech defect  Stable s/p CVA    6. Executive function deficit  Stable w/o progressive sx    7. Hypocalcemia  - Comprehensive Metabolic Panel; Future    8. Hypertension, essential, benign  blood pressure stable @ goal, check bloodwork for:  - Lipid Panel; Future  - TSH without Reflex;  Future  - Hemoglobin A1C; Future    9. Acute right ankle pain  Exam nonfocal, c/o minimal lateral ankle pain  Check xray  Management pending results. - XR ANKLE RIGHT (MIN 3 VIEWS); Future           Follow-up appointment:   Pending xray results/bloodwork results    Discussed use, benefit, and side effects of all prescribed medications. Barriers to medication compliance addressed. All patient questions answered. Pt voiced understanding. When applicable, patient's outside records were reviewed through [facility]. The patient has signed appropriate paperworks/consents.

## 2019-05-09 LAB
ESTIMATED AVERAGE GLUCOSE: 134.1 MG/DL
HBA1C MFR BLD: 6.3 %

## 2019-07-29 ENCOUNTER — HOSPITAL ENCOUNTER (OUTPATIENT)
Dept: CT IMAGING | Age: 44
Discharge: HOME | End: 2019-07-29
Payer: COMMERCIAL

## 2019-07-29 DIAGNOSIS — I67.1 CEREBRAL ANEURYSM, NONRUPTURED: ICD-10-CM

## 2019-07-29 PROCEDURE — 6360000004 HC RX CONTRAST MEDICATION: Performed by: NEUROLOGICAL SURGERY

## 2019-07-29 PROCEDURE — 70496 CT ANGIOGRAPHY HEAD: CPT

## 2019-07-29 RX ADMIN — IOPAMIDOL 80 ML: 755 INJECTION, SOLUTION INTRAVENOUS at 10:07

## 2019-08-05 RX ORDER — LEVETIRACETAM 250 MG/1
250 TABLET ORAL DAILY
Qty: 90 TABLET | Refills: 0 | Status: SHIPPED | OUTPATIENT
Start: 2019-08-05 | End: 2020-05-11

## 2019-09-12 RX ORDER — VERAPAMIL HYDROCHLORIDE 300 MG/1
CAPSULE, EXTENDED RELEASE ORAL
Qty: 90 CAPSULE | Refills: 1 | Status: SHIPPED | OUTPATIENT
Start: 2019-09-12 | End: 2020-02-17

## 2019-10-15 RX ORDER — METOPROLOL SUCCINATE 50 MG/1
TABLET, EXTENDED RELEASE ORAL
Qty: 90 TABLET | Refills: 1 | Status: SHIPPED | OUTPATIENT
Start: 2019-10-15 | End: 2020-03-20

## 2019-10-15 RX ORDER — CALCITRIOL 0.25 UG/1
CAPSULE, LIQUID FILLED ORAL
Qty: 270 CAPSULE | Refills: 1 | Status: SHIPPED | OUTPATIENT
Start: 2019-10-15 | End: 2020-03-20

## 2019-10-31 RX ORDER — CALCITRIOL 0.25 UG/1
CAPSULE, LIQUID FILLED ORAL
Qty: 270 CAPSULE | Refills: 0 | Status: SHIPPED | OUTPATIENT
Start: 2019-10-31 | End: 2021-06-03 | Stop reason: SDUPTHER

## 2019-10-31 RX ORDER — METOPROLOL SUCCINATE 50 MG/1
TABLET, EXTENDED RELEASE ORAL
Qty: 90 TABLET | Refills: 0 | Status: SHIPPED | OUTPATIENT
Start: 2019-10-31 | End: 2021-06-03 | Stop reason: SDUPTHER

## 2020-02-17 RX ORDER — VERAPAMIL HYDROCHLORIDE 300 MG/1
CAPSULE, EXTENDED RELEASE ORAL
Qty: 90 CAPSULE | Refills: 4 | Status: SHIPPED | OUTPATIENT
Start: 2020-02-17 | End: 2020-09-18 | Stop reason: SDUPTHER

## 2020-02-17 NOTE — TELEPHONE ENCOUNTER
Requested Prescriptions     Pending Prescriptions Disp Refills    Verapamil HCl  MG CP24 [Pharmacy Med Name: VERAPAMIL HCL ER PM CAPS 300MG] 90 capsule 4     Sig: TAKE 1 CAPSULE DAILY     Last ov 5/8/19  Last labs 5/8/19

## 2020-03-20 RX ORDER — CALCITRIOL 0.25 UG/1
CAPSULE, LIQUID FILLED ORAL
Qty: 270 CAPSULE | Refills: 1 | Status: SHIPPED | OUTPATIENT
Start: 2020-03-20 | End: 2020-09-16

## 2020-03-20 RX ORDER — METOPROLOL SUCCINATE 50 MG/1
TABLET, EXTENDED RELEASE ORAL
Qty: 90 TABLET | Refills: 1 | Status: SHIPPED | OUTPATIENT
Start: 2020-03-20 | End: 2020-09-16

## 2020-05-11 RX ORDER — LEVETIRACETAM 250 MG/1
250 TABLET ORAL DAILY
Qty: 90 TABLET | Refills: 0 | Status: SHIPPED | OUTPATIENT
Start: 2020-05-11 | End: 2020-08-06

## 2020-07-26 ENCOUNTER — TELEPHONE (OUTPATIENT)
Dept: FAMILY MEDICINE CLINIC | Age: 45
End: 2020-07-26

## 2020-08-06 RX ORDER — LEVETIRACETAM 250 MG/1
250 TABLET ORAL DAILY
Qty: 90 TABLET | Refills: 0 | Status: SHIPPED | OUTPATIENT
Start: 2020-08-06 | End: 2021-06-03 | Stop reason: SDUPTHER

## 2020-08-06 NOTE — TELEPHONE ENCOUNTER
Requested Prescriptions     Pending Prescriptions Disp Refills    levETIRAcetam (KEPPRA) 250 MG tablet [Pharmacy Med Name: LEVETIRACETAM 250MG TABLETS] 90 tablet 0     Sig: TAKE 1 TABLET BY MOUTH DAILY     Last ov 5/8/19  Last labs 5/8/19

## 2020-08-31 NOTE — TELEPHONE ENCOUNTER
will like to know if you will send in Rx to get her by til 9/18 appointment. He also will need a Rx for his BP medications as he is also scheduled for 9/18 as well. Please Advise. Told him will call once received answer.

## 2020-09-16 RX ORDER — METOPROLOL SUCCINATE 50 MG/1
TABLET, EXTENDED RELEASE ORAL
Qty: 90 TABLET | Refills: 3 | Status: SHIPPED | OUTPATIENT
Start: 2020-09-16 | End: 2020-09-18 | Stop reason: SDUPTHER

## 2020-09-16 RX ORDER — CALCITRIOL 0.25 UG/1
CAPSULE, LIQUID FILLED ORAL
Qty: 270 CAPSULE | Refills: 3 | Status: SHIPPED | OUTPATIENT
Start: 2020-09-16 | End: 2020-09-18 | Stop reason: SDUPTHER

## 2020-09-16 NOTE — TELEPHONE ENCOUNTER
Requested Prescriptions     Pending Prescriptions Disp Refills    metoprolol succinate (TOPROL XL) 50 MG extended release tablet [Pharmacy Med Name: METOPROLOL SUCCINATE ER TABS 50MG] 90 tablet 3     Sig: TAKE 1 TABLET DAILY    calcitRIOL (ROCALTROL) 0.25 MCG capsule [Pharmacy Med Name: CALCITRIOL CAPS 0.25MCG] 270 capsule 3     Sig: TAKE 1 CAPSULE EVERY MORNING AND 2 CAPSULES EVERY EVENING     LOV:5/8/2019  [address]

## 2020-09-18 ENCOUNTER — OFFICE VISIT (OUTPATIENT)
Dept: FAMILY MEDICINE CLINIC | Age: 45
End: 2020-09-18
Payer: COMMERCIAL

## 2020-09-18 VITALS
TEMPERATURE: 97.7 F | HEIGHT: 65 IN | WEIGHT: 181.1 LBS | SYSTOLIC BLOOD PRESSURE: 118 MMHG | DIASTOLIC BLOOD PRESSURE: 82 MMHG | OXYGEN SATURATION: 99 % | HEART RATE: 62 BPM | RESPIRATION RATE: 15 BRPM | BODY MASS INDEX: 30.17 KG/M2

## 2020-09-18 PROCEDURE — G8427 DOCREV CUR MEDS BY ELIG CLIN: HCPCS | Performed by: FAMILY MEDICINE

## 2020-09-18 PROCEDURE — 1036F TOBACCO NON-USER: CPT | Performed by: FAMILY MEDICINE

## 2020-09-18 PROCEDURE — G8417 CALC BMI ABV UP PARAM F/U: HCPCS | Performed by: FAMILY MEDICINE

## 2020-09-18 PROCEDURE — 99214 OFFICE O/P EST MOD 30 MIN: CPT | Performed by: FAMILY MEDICINE

## 2020-09-18 RX ORDER — CALCITRIOL 0.25 UG/1
CAPSULE, LIQUID FILLED ORAL
Qty: 270 CAPSULE | Refills: 0 | Status: CANCELLED | OUTPATIENT
Start: 2020-09-18

## 2020-09-18 RX ORDER — LEVETIRACETAM 250 MG/1
250 TABLET ORAL DAILY
Qty: 90 TABLET | Refills: 0 | Status: CANCELLED | OUTPATIENT
Start: 2020-09-18

## 2020-09-18 RX ORDER — CALCITRIOL 0.25 UG/1
CAPSULE, LIQUID FILLED ORAL
Qty: 270 CAPSULE | Refills: 1 | Status: SHIPPED | OUTPATIENT
Start: 2020-09-18 | End: 2021-06-03 | Stop reason: SDUPTHER

## 2020-09-18 RX ORDER — METOPROLOL SUCCINATE 50 MG/1
TABLET, EXTENDED RELEASE ORAL
Qty: 90 TABLET | Refills: 0 | Status: CANCELLED | OUTPATIENT
Start: 2020-09-18

## 2020-09-18 RX ORDER — VERAPAMIL HYDROCHLORIDE 300 MG/1
CAPSULE, EXTENDED RELEASE ORAL
Qty: 90 CAPSULE | Refills: 1 | Status: SHIPPED | OUTPATIENT
Start: 2020-09-18 | End: 2021-06-01 | Stop reason: SDUPTHER

## 2020-09-18 RX ORDER — PHENOL 1.4 %
1 AEROSOL, SPRAY (ML) MUCOUS MEMBRANE DAILY
Qty: 90 TABLET | Refills: 1 | Status: SHIPPED | OUTPATIENT
Start: 2020-09-18 | End: 2021-06-03 | Stop reason: SDUPTHER

## 2020-09-18 RX ORDER — METOPROLOL SUCCINATE 50 MG/1
TABLET, EXTENDED RELEASE ORAL
Qty: 90 TABLET | Refills: 1 | Status: SHIPPED | OUTPATIENT
Start: 2020-09-18 | End: 2021-06-03 | Stop reason: SDUPTHER

## 2020-09-18 RX ORDER — LEVETIRACETAM 250 MG/1
250 TABLET ORAL DAILY
Qty: 90 TABLET | Refills: 1 | Status: SHIPPED | OUTPATIENT
Start: 2020-09-18 | End: 2021-05-18 | Stop reason: SDUPTHER

## 2020-09-18 NOTE — PROGRESS NOTES
Here for f/u and recheck of blood pressure, low mag, calcium    Pt has been in the house for the entire COVID pandemic, and is getting out intermittently but most of the time is staying home/in the house. Pt has been staying healthy, during the day is doing well to stay busy, has been taking care of her cats, playing video games, and some household chores. Doing some simple tasks and that is doing ok. Per , she has times that she can see 'out of it' and had stroke-like symptoms with worsening expressive aphasia but sx were short-lived and returned to baseline. Per , went back to baseline after an hour or so. She does get these sx intemrittently, but seemed to be triggered by fatigue. No recent sx. Pt doing well to take meds every day, but is overdue for f/u bloodwork. She is usually very sedentary and is not getting any exercise. Sleep is doing ok, seems regular/consistent. Pt has not c/o of any new issues or concerns that  is aware of    Pt here for follow up of blood pressure. Pt states doing great with adherence to therapy and feels well. No issues of chest pain, shortness of breath. No vision changes, headache, swelling in legs. Balance seems to be ok, did have a fall in bathtub but did not get much in terms of injury/trauma recently. Pt has been consistent with her meds, taking regularly and  is helping her to take meds        Except as noted above in the history of present illness, the review of systems is  negative for headache, vision changes, chest pain, shortness of breath, abdominal pain, urinary sx, bowel changes. Past medical, surgical, and social history reviewed and updated  Medications and allergies reviewed and updated         O: /82   Pulse 62   Temp 97.7 °F (36.5 °C) (Temporal)   Resp 15   Ht 5' 5\" (1.651 m)   Wt 181 lb 1.6 oz (82.1 kg)   SpO2 99%   BMI 30.14 kg/m²   GEN: No acute distress, cooperative, well nourished, alert.    HEENT: PEERLA, EOMI , normocephalic/atraumatic, nares and oropharynx clear. Mucous membranes normal, Tympanic membranes clear bilaterally. Neck: soft, supple, no thyromegaly, mass, no Lymphadenopathy  CV: Regular rate and rhythm, no murmur, rubs, gallops. No edema. Resp: Clear to auscultation bilaterally good air entry bilaterally  No crackles, wheeze. Breathing comfortably. Ext: no clubbing, cyanosis, edema        Current Outpatient Medications   Medication Sig Dispense Refill    Verapamil HCl  MG CP24 TAKE 1 CAPSULE DAILY 90 capsule 1    metoprolol succinate (TOPROL XL) 50 MG extended release tablet TAKE 1 TABLET DAILY 90 tablet 1    magnesium oxide (MAGNESIUM-OXIDE) 400 (241.3 Mg) MG TABS tablet TAKE 1 TABLET BY MOUTH TWICE DAILY 180 tablet 1    levETIRAcetam (KEPPRA) 250 MG tablet Take 1 tablet by mouth daily 90 tablet 1    calcitRIOL (ROCALTROL) 0.25 MCG capsule TAKE 1 CAPSULE EVERY MORNING AND 2 CAPSULES EVERY EVENING 270 capsule 1    calcium carbonate 600 MG TABS tablet Take 1 tablet by mouth daily 90 tablet 1    levETIRAcetam (KEPPRA) 250 MG tablet TAKE 1 TABLET BY MOUTH DAILY 90 tablet 0    metoprolol succinate (TOPROL XL) 50 MG extended release tablet TAKE 1 TABLET BY MOUTH EVERY DAY 90 tablet 0    calcitRIOL (ROCALTROL) 0.25 MCG capsule TAKE ONE CAPSULE BY MOUTH EVERY MORNING AND TWO CAPSULES EVERY EVENING 270 capsule 0     No current facility-administered medications for this visit. ASSESSMENT / PLAN:    1. Cerebral infarction, unspecified mechanism (HCC)  Stable w/o recurrent sx  Cont supportive therapy, monitor    2. Obstructive hydrocephalus (HCC)  Stable w/o neurologic sx  Cont supportive therapy  F/u with neurosurgery as scheduled    3. Hypoparathyroidism, unspecified hypoparathyroidism type ([facility] [address]  Cont supplementation  Check f.u bloodwork as below  - MAGNESIUM; Future  - PHOSPHORUS; Future  - PTH, INTACT; Future    4.  Hyperglycemia  Aware risk of diabetes mellitus  Check a1c  - Hemoglobin A1C; Future    5. Hypomagnesemia  - MAGNESIUM; Future    6. Late effects of CVA (cerebrovascular accident)  Cont supportive therapy    7. Speech defect  Stable w/o progressive sx    8. Expressive aphasia  stable    9. Executive function deficit  S/p CVA  stable  - TSH without Reflex; Future    10. Hypertension, essential, benign  blood pressure stable @ goal, controlled  Check bloodwork for:  - CBC; Future  - Comprehensive Metabolic Panel; Future  - Lipid Panel; Future    11. Hypocalcemia  Cont supplementation  Check f/u bloodwork     12. Screening for breast cancer  Due mammo  Order given  - BEATRIZ DIGITAL SCREEN W OR WO CAD BILATERAL; Future    13. Essential hypertension, benign  - TSH without Reflex; Future           Follow-up appointment:   Pending bloodwork results    Discussed use, benefit, and side effects of all prescribed medications. Barriers to medication compliance addressed. All patient questions answered. Pt voiced understanding. When applicable, patient's outside records were reviewed through [facility]. The patient has signed appropriate paperworks/consents.

## 2020-09-20 ASSESSMENT — PATIENT HEALTH QUESTIONNAIRE - PHQ9
1. LITTLE INTEREST OR PLEASURE IN DOING THINGS: 0
SUM OF ALL RESPONSES TO PHQ9 QUESTIONS 1 & 2: 0
SUM OF ALL RESPONSES TO PHQ QUESTIONS 1-9: 0
2. FEELING DOWN, DEPRESSED OR HOPELESS: 0
SUM OF ALL RESPONSES TO PHQ QUESTIONS 1-9: 0

## 2020-10-05 NOTE — TELEPHONE ENCOUNTER
Requested Prescriptions     Pending Prescriptions Disp Refills    magnesium oxide (MAGNESIUM-OXIDE) 400 (241.3 Mg) MG TABS tablet [Pharmacy Med Name: MAG-OXIDE 400MG TABLETS] 60 tablet      Sig: TAKE 1 TABLET BY MOUTH TWICE DAILY     Easton Patrick

## 2020-10-26 ENCOUNTER — HOSPITAL ENCOUNTER (OUTPATIENT)
Dept: MAMMOGRAPHY | Age: 45
Discharge: HOME | End: 2020-10-26
Payer: COMMERCIAL

## 2020-10-26 DIAGNOSIS — R41.844 EXECUTIVE FUNCTION DEFICIT: ICD-10-CM

## 2020-10-26 DIAGNOSIS — R73.9 HYPERGLYCEMIA: ICD-10-CM

## 2020-10-26 DIAGNOSIS — E20.9 HYPOPARATHYROIDISM, UNSPECIFIED HYPOPARATHYROIDISM TYPE (HCC): ICD-10-CM

## 2020-10-26 DIAGNOSIS — I10 HYPERTENSION, ESSENTIAL, BENIGN: ICD-10-CM

## 2020-10-26 DIAGNOSIS — E83.42 HYPOMAGNESEMIA: ICD-10-CM

## 2020-10-26 DIAGNOSIS — I10 ESSENTIAL HYPERTENSION, BENIGN: ICD-10-CM

## 2020-10-26 LAB
HCT VFR BLD CALC: 42.7 % (ref 36–48)
HEMOGLOBIN: 14.3 G/DL (ref 12–16)
MCH RBC QN AUTO: 29.5 PG (ref 26–34)
MCHC RBC AUTO-ENTMCNC: 33.6 G/DL (ref 31–36)
MCV RBC AUTO: 87.8 FL (ref 80–100)
PDW BLD-RTO: 13.9 % (ref 12.4–15.4)
PLATELET # BLD: 231 K/UL (ref 135–450)
PMV BLD AUTO: 10.1 FL (ref 5–10.5)
RBC # BLD: 4.86 M/UL (ref 4–5.2)
WBC # BLD: 8.3 K/UL (ref 4–11)

## 2020-10-26 PROCEDURE — 77067 SCR MAMMO BI INCL CAD: CPT

## 2020-10-27 LAB
A/G RATIO: 1.7 (ref 1.1–2.2)
ALBUMIN SERPL-MCNC: 4.5 G/DL (ref 3.4–5)
ALP BLD-CCNC: 66 U/L (ref 40–129)
ALT SERPL-CCNC: 8 U/L (ref 10–40)
ANION GAP SERPL CALCULATED.3IONS-SCNC: 17 MMOL/L (ref 3–16)
AST SERPL-CCNC: 9 U/L (ref 15–37)
BILIRUB SERPL-MCNC: 0.4 MG/DL (ref 0–1)
BUN BLDV-MCNC: 27 MG/DL (ref 7–20)
CALCIUM SERPL-MCNC: 8.2 MG/DL (ref 8.3–10.6)
CHLORIDE BLD-SCNC: 104 MMOL/L (ref 99–110)
CHOLESTEROL, TOTAL: 214 MG/DL (ref 0–199)
CO2: 24 MMOL/L (ref 21–32)
CREAT SERPL-MCNC: 1.2 MG/DL (ref 0.6–1.1)
ESTIMATED AVERAGE GLUCOSE: 119.8 MG/DL
GFR AFRICAN AMERICAN: 58
GFR NON-AFRICAN AMERICAN: 48
GLOBULIN: 2.7 G/DL
GLUCOSE BLD-MCNC: 95 MG/DL (ref 70–99)
HBA1C MFR BLD: 5.8 %
HDLC SERPL-MCNC: 48 MG/DL (ref 40–60)
LDL CHOLESTEROL CALCULATED: 128 MG/DL
MAGNESIUM: 1.8 MG/DL (ref 1.8–2.4)
PARATHYROID HORMONE INTACT: 3 PG/ML (ref 14–72)
PHOSPHORUS: 5.7 MG/DL (ref 2.5–4.9)
POTASSIUM SERPL-SCNC: 4.2 MMOL/L (ref 3.5–5.1)
SODIUM BLD-SCNC: 145 MMOL/L (ref 136–145)
TOTAL PROTEIN: 7.2 G/DL (ref 6.4–8.2)
TRIGL SERPL-MCNC: 192 MG/DL (ref 0–150)
TSH SERPL DL<=0.05 MIU/L-ACNC: 0.76 UIU/ML (ref 0.27–4.2)
VLDLC SERPL CALC-MCNC: 38 MG/DL

## 2021-01-04 ENCOUNTER — HOSPITAL ENCOUNTER (OUTPATIENT)
Dept: MAMMOGRAPHY | Age: 46
Discharge: HOME | End: 2021-01-04
Payer: COMMERCIAL

## 2021-01-04 DIAGNOSIS — R92.8 ABNORMAL MAMMOGRAM: ICD-10-CM

## 2021-01-04 PROCEDURE — 77065 DX MAMMO INCL CAD UNI: CPT

## 2021-01-29 ENCOUNTER — PATIENT MESSAGE (OUTPATIENT)
Dept: FAMILY MEDICINE CLINIC | Age: 46
End: 2021-01-29

## 2021-02-01 NOTE — TELEPHONE ENCOUNTER
Please advise    \"These results and recommendations were discussed with the patient at the time of the exam.\"    NO, they were not.

## 2021-02-03 NOTE — TELEPHONE ENCOUNTER
Fabien Begum requested to schedule VV to discuss mammogram and why pt needs a biospy.     VV scheduled 2/4

## 2021-02-04 ENCOUNTER — VIRTUAL VISIT (OUTPATIENT)
Dept: FAMILY MEDICINE CLINIC | Age: 46
End: 2021-02-04
Payer: COMMERCIAL

## 2021-02-04 DIAGNOSIS — I69.90 LATE EFFECTS OF CVA (CEREBROVASCULAR ACCIDENT): ICD-10-CM

## 2021-02-04 DIAGNOSIS — R92.1 BREAST CALCIFICATIONS: ICD-10-CM

## 2021-02-04 DIAGNOSIS — R92.8 ABNORMAL MAMMOGRAM: Primary | ICD-10-CM

## 2021-02-04 DIAGNOSIS — I10 ESSENTIAL HYPERTENSION, BENIGN: ICD-10-CM

## 2021-02-04 PROCEDURE — 99442 PR PHYS/QHP TELEPHONE EVALUATION 11-20 MIN: CPT | Performed by: FAMILY MEDICINE

## 2021-02-04 ASSESSMENT — PATIENT HEALTH QUESTIONNAIRE - PHQ9
SUM OF ALL RESPONSES TO PHQ QUESTIONS 1-9: 0
SUM OF ALL RESPONSES TO PHQ QUESTIONS 1-9: 0
2. FEELING DOWN, DEPRESSED OR HOPELESS: 0
1. LITTLE INTEREST OR PLEASURE IN DOING THINGS: 0
SUM OF ALL RESPONSES TO PHQ9 QUESTIONS 1 & 2: 0
SUM OF ALL RESPONSES TO PHQ QUESTIONS 1-9: 0

## 2021-02-04 NOTE — Clinical Note
Good evening  Hope you are well and staying healthy. .. Just an FYI on a patient I am sending. She is a nice 50 yr old with hx of brain aneurysm and significant memory issues/ functional concerns since. She had a screening mammo with calcifications and f/u diagnostic and they recommended biopsy.   Her  was concerned about her going through the process so I suggested they come to see you first.  She is very nice but will need to have her  there to help with decision making    arvin

## 2021-02-25 ENCOUNTER — TELEPHONE (OUTPATIENT)
Dept: SURGERY | Age: 46
End: 2021-02-25

## 2021-02-25 NOTE — TELEPHONE ENCOUNTER
Breast History:  History of Previous Breast Biopsy:no  Self Breast Exams Completed:no  Family History of Breast Cancer:no    Family History of Other Cancers:no   Ashkenazi Catholic Decent:no  Bra Size: 34C    Gyne History:  : 0   Para: 0  Age of Menarche: 13 years  Age of Menopause: No   Age of first live Birth: N/A  History of Hysterectomy / SARAH-BSO: No Ablation   History of OCP's/HRT:No     Family History or personal history of Ovarian Cancer: no       19.2 % Lifetime Risk

## 2021-02-26 ENCOUNTER — OFFICE VISIT (OUTPATIENT)
Dept: SURGERY | Age: 46
End: 2021-02-26
Payer: COMMERCIAL

## 2021-02-26 VITALS
HEART RATE: 76 BPM | BODY MASS INDEX: 30.02 KG/M2 | HEIGHT: 65 IN | SYSTOLIC BLOOD PRESSURE: 110 MMHG | WEIGHT: 180.2 LBS | OXYGEN SATURATION: 97 % | DIASTOLIC BLOOD PRESSURE: 69 MMHG | TEMPERATURE: 97.2 F

## 2021-02-26 DIAGNOSIS — R92.8 ABNORMAL MAMMOGRAM OF LEFT BREAST: Primary | ICD-10-CM

## 2021-02-26 PROCEDURE — G8417 CALC BMI ABV UP PARAM F/U: HCPCS | Performed by: SURGERY

## 2021-02-26 PROCEDURE — G8484 FLU IMMUNIZE NO ADMIN: HCPCS | Performed by: SURGERY

## 2021-02-26 PROCEDURE — G8427 DOCREV CUR MEDS BY ELIG CLIN: HCPCS | Performed by: SURGERY

## 2021-02-26 PROCEDURE — 1036F TOBACCO NON-USER: CPT | Performed by: SURGERY

## 2021-02-26 PROCEDURE — 99203 OFFICE O/P NEW LOW 30 MIN: CPT | Performed by: SURGERY

## 2021-02-26 NOTE — PATIENT INSTRUCTIONS
Mammogram results were discussed with patient today in office  Breast exam was unremarkable for any palpable masses  Continue with monthly self breast exams  Nurse Navigator will call to set up appointment for Biopsy     Healthy Lifestyle Recommendations: healthy diet (decrease consumption of red meat, increase fresh fruits and vegetables), decreased alcohol consumption (less than 4 drinks/week), adequate sleep (goal 6-8 hours), routine exercise (goal 150 minutes/week or greater), weight control.

## 2021-02-26 NOTE — PROGRESS NOTES
Subjective:      Patient ID: Carlitos Mercer is a 50 y.o. female. HPI   Chief Complaint   Patient presents with   [facility] Patient     Patient underwent a screening mammogram 10/2020 showing extremely dense breast tissue and a group of microcalcifications left breast. Diagnostic mammogram 2021 shows increased calcifications left upper outer breast, and stereotactic biopsy was recommended. Patient has not felt any breast masses, no nipple discharge or breast pain. Here with .     Breast History:  History of Previous Breast Biopsy:no  Self Breast Exams Completed:no  Family History of Breast Cancer:no    Family History of Other Cancers:no   [employer] Decent:no  Bra Size: 34C     Gyne History:  : 0   Para: 0  Age of Menarche: 13 years  Age of Menopause: No   Age of first live Birth: N/A  History of Hysterectomy / SARAH-BSO: No Ablation   History of OCP's/HRT:No     Family History or personal history of Ovarian Cancer: no         19.2 % Lifetime Risk                   Past Medical History:   Diagnosis Date    Acute respiratory failure ([address]     Allergic rhinitis, cause unspecified     Essential hypertension, benign     Executive function deficit 3/16/2011    Hydronephrosis     Hyperglycemia     Hypocalcemia     Hypomagnesemia 2013    Hypoparathyroidism ([address]     Insomnia, unspecified     Middle cerebral artery aneurysm     Migraine, unspecified, without mention of intractable migraine without mention of status migrainosus     Obstructive hydrocephalus (HCC)     Pain in joint, shoulder region     shoulder    PPD positive     Speech defect 2013    Subarachnoid hemorrhage ([address]     Unspecified cerebral artery occlusion with cerebral infarction        Past Surgical History:   Procedure Laterality Date    COLPOSCOPY      CRANIOTOMY      DILATION AND CURETTAGE OF UTERUS  2014    Hysteroscopy, Novasure Endometrial Ablation    ENDOMETRIAL ABLATION  2014  HYSTEROSCOPY  2014    TRACHEOSTOMY      VENTRICULOPERITONEAL SHUNT         Current Outpatient Medications   Medication Sig Dispense Refill    magnesium oxide (MAGNESIUM-OXIDE) 400 (241.3 Mg) MG TABS tablet TAKE 1 TABLET BY MOUTH TWICE DAILY 60 tablet 5    Verapamil HCl  MG CP24 TAKE 1 CAPSULE DAILY 90 capsule 1    metoprolol succinate (TOPROL XL) 50 MG extended release tablet TAKE 1 TABLET DAILY 90 tablet 1    levETIRAcetam (KEPPRA) 250 MG tablet Take 1 tablet by mouth daily 90 tablet 1    calcitRIOL (ROCALTROL) 0.25 MCG capsule TAKE 1 CAPSULE EVERY MORNING AND 2 CAPSULES EVERY EVENING 270 capsule 1    calcium carbonate 600 MG TABS tablet Take 1 tablet by mouth daily 90 tablet 1    levETIRAcetam (KEPPRA) 250 MG tablet TAKE 1 TABLET BY MOUTH DAILY 90 tablet 0    metoprolol succinate (TOPROL XL) 50 MG extended release tablet TAKE 1 TABLET BY MOUTH EVERY DAY 90 tablet 0    calcitRIOL (ROCALTROL) 0.25 MCG capsule TAKE ONE CAPSULE BY MOUTH EVERY MORNING AND TWO CAPSULES EVERY EVENING 270 capsule 0     No current facility-administered medications for this visit.         Social History     Socioeconomic History    Marital status:      Spouse name: Not on file    Number of children: Not on file    Years of education: Not on file    Highest education level: Not on file   Occupational History    Not on file   Social Needs    Financial resource strain: Not on file    Food insecurity     Worry: Not on file     Inability: Not on file    Transportation needs     Medical: Not on file     Non-medical: Not on file   Tobacco Use    Smoking status: Former Smoker     Packs/day: 0.20     Years: 20.00     Pack years: 4.00     Types: Cigarettes     Quit date: 2011     Years since quittin.3    Smokeless tobacco: Never Used   Substance and Sexual Activity    Alcohol use: No     Alcohol/week: 0.0 standard drinks    Drug use: No    Sexual activity: Not Currently   Lifestyle    Physical activity     Days per week: Not on file     Minutes per session: Not on file    Stress: Not on file   Relationships    Social connections     Talks on phone: Not on file     Gets together: Not on file     Attends Gnosticism service: Not on file     Active member of club or organization: Not on file     Attends meetings of clubs or organizations: Not on file     Relationship status: Not on file    Intimate partner violence     Fear of current or ex partner: Not on file     Emotionally abused: Not on file     Physically abused: Not on file     Forced sexual activity: Not on file   Other Topics Concern    Not on file   Social History Narrative    Not on file       ROS  Constitutional: no weight loss, fever, night sweats   Skin: negative  Cardiovascular: no chest pain or palpitations   Pulmonary: No cough, sputum, or hemoptysis   GI:No abdominal pain  Breast: see above  All other systems were reviewed and are negative    Objective:   Physical Exam  Vitals signs reviewed. Exam conducted with a chaperone present. Constitutional:       General: She is not in acute distress. Appearance: Normal appearance. She is well-developed. She is not diaphoretic. HENT:      Head: Normocephalic and atraumatic. Nose: Nose normal.      Mouth/Throat:      Mouth: Mucous membranes are moist.      Pharynx: Oropharynx is clear. Neck:      Musculoskeletal: Normal range of motion. No muscular tenderness. Trachea: No tracheal deviation. Cardiovascular:      Rate and Rhythm: Normal rate. Pulmonary:      Effort: Pulmonary effort is normal. No respiratory distress. Breath sounds: No wheezing. Abdominal:      General: There is no distension. Palpations: Abdomen is soft. Musculoskeletal: Normal range of motion. General: No tenderness. Lymphadenopathy:      Cervical: No cervical adenopathy. Upper Body:      Right upper body: No axillary adenopathy. Left upper body: No axillary adenopathy. Skin:     General: Skin is warm and dry. Coloration: Skin is not pale. Findings: No erythema or rash. Neurological:      Mental Status: She is alert. Cranial Nerves: No cranial nerve deficit. Coordination: Coordination normal.     bilateral breasts - no masses, no nipple or skin changes    Assessment:       Diagnosis Orders   1. Abnormal mammogram of left breast             Plan:      No masses on exam. We discussed the mammographic findings and the importance of stereotactic biopsy of this area of microcalcifications. They are agreeable to proceed. Will arrange with the nurse navigator.          [address], [place]

## 2021-03-29 ENCOUNTER — TELEPHONE (OUTPATIENT)
Dept: BREAST CENTER | Age: 46
End: 2021-03-29

## 2021-03-29 NOTE — TELEPHONE ENCOUNTER
Patient called in said she was suppose to get a breast biopsy about a month ago and she hasnt heard anything about when to get it. She can be reached at [phone].

## 2021-04-01 ENCOUNTER — IMMUNIZATION (OUTPATIENT)
Dept: FAMILY MEDICINE CLINIC | Age: 46
End: 2021-04-01
Payer: COMMERCIAL

## 2021-04-01 ENCOUNTER — TELEPHONE (OUTPATIENT)
Dept: WOMENS IMAGING | Age: 46
End: 2021-04-01

## 2021-04-01 ENCOUNTER — TELEPHONE (OUTPATIENT)
Dept: FAMILY MEDICINE CLINIC | Age: 46
End: 2021-04-01

## 2021-04-01 DIAGNOSIS — R92.8 ABNORMAL MAMMOGRAM: Primary | ICD-10-CM

## 2021-04-01 PROCEDURE — 91300 COVID-19, PFIZER VACCINE 30MCG/0.3ML DOSE: CPT | Performed by: FAMILY MEDICINE

## 2021-04-01 PROCEDURE — 0001A COVID-19, PFIZER VACCINE 30MCG/0.3ML DOSE: CPT | Performed by: FAMILY MEDICINE

## 2021-04-01 NOTE — TELEPHONE ENCOUNTER
Nurse Navigator reviewed pre-procedure stereo breast biopsy patient education information over the phone with  Joey Adair who is her caregiver. Written instructions will be given the day of biopsy. Reviewed medications and need to hold all blood thinners per instructions. NO medications to hold. Patient should take all other medications as prescribed. Patient to eat and drink as normal prior to the procedure. Wear a bra with good support and a two piece outfit for comfort. Patient can bring someone with you but you can also drive yourself. Plan on being at the breast center for 2-2 and a half hours. Reviewed the process of a biopsy - sitting in a chair with your breast compressed similar to a mammogram with frequent images taken throughout the procedure. The skin is cleaned and a local anesthetic is given to numb the area. A small skin nick is made for the biopsy needle and once in place, samples are taken and then xrayed for confirmation. A tiny tissue marker is then placed inside your breast at the site of the biopsy for future reference. Then pressure is hold on the biopsy site to stop the bleeding and a bandage and waterproof dressing is applied. A mammogram is then done to validate the tissue marker. The tissue sample is sent to pathology. Results will come back in 2-3 business days and sent to your referring physician. Either they or the nurse navigator will call you with the results and recommended  follow up needed  Patient verbalizes understanding.

## 2021-04-01 NOTE — TELEPHONE ENCOUNTER
Katrina called in stating pt was trying to get imaging listed below completed. Left stereotactic biopsy [id number]    Would like Dr. Aryan Simpson to order this and call her back once it is completed. Please advise, thank you!

## 2021-04-12 ENCOUNTER — TELEPHONE (OUTPATIENT)
Dept: WOMENS IMAGING | Age: 46
End: 2021-04-12

## 2021-04-12 ENCOUNTER — HOSPITAL ENCOUNTER (OUTPATIENT)
Dept: WOMENS IMAGING | Age: 46
Discharge: HOME | End: 2021-04-12
Payer: COMMERCIAL

## 2021-04-12 DIAGNOSIS — R92.8 ABNORMAL MAMMOGRAM: ICD-10-CM

## 2021-04-12 PROCEDURE — 2500000003 HC RX 250 WO HCPCS: Performed by: FAMILY MEDICINE

## 2021-04-12 PROCEDURE — 76098 X-RAY EXAM SURGICAL SPECIMEN: CPT

## 2021-04-12 PROCEDURE — 77065 DX MAMMO INCL CAD UNI: CPT

## 2021-04-12 PROCEDURE — 88305 TISSUE EXAM BY PATHOLOGIST: CPT

## 2021-04-12 PROCEDURE — 2720000010 MAM STEREO BREAST BX W LOC DEVICE 1ST LESION LEFT

## 2021-04-12 RX ORDER — LIDOCAINE HYDROCHLORIDE AND EPINEPHRINE 10; 10 MG/ML; UG/ML
20 INJECTION, SOLUTION INFILTRATION; PERINEURAL ONCE
Status: COMPLETED | OUTPATIENT
Start: 2021-04-12 | End: 2021-04-12

## 2021-04-12 RX ADMIN — LIDOCAINE HYDROCHLORIDE,EPINEPHRINE BITARTRATE 20 ML: 10; .01 INJECTION, SOLUTION INFILTRATION; PERINEURAL at 14:02

## 2021-04-12 NOTE — PROGRESS NOTES
Patient here for stereo LEFT breast biopsy. NN reviewed the health history, allergies and medications along with  Deborahcitlalidelaney Johnstons. No meds to hold. Family member   Brigid Nickerson  drove her. Radiologist reviews procedure with patient, consent signed. Patient tolerates procedure well. Compression held. Site cleansed, steri strips and dry dressing applied. Ice pack in place. Reviewed discharge instructions with patient and  and given a signed copy. Patient and  Brigid Nickerson verbalized understanding and agreed to contact Nurse Navigator with any questions. Patient A&Ox4, steady on feet and discharged home.

## 2021-04-14 ENCOUNTER — TELEPHONE (OUTPATIENT)
Dept: WOMENS IMAGING | Age: 46
End: 2021-04-14

## 2021-04-22 ENCOUNTER — IMMUNIZATION (OUTPATIENT)
Dept: FAMILY MEDICINE CLINIC | Age: 46
End: 2021-04-22
Payer: COMMERCIAL

## 2021-04-22 PROCEDURE — 91300 COVID-19, PFIZER VACCINE 30MCG/0.3ML DOSE: CPT | Performed by: FAMILY MEDICINE

## 2021-04-22 PROCEDURE — 0002A COVID-19, PFIZER VACCINE 30MCG/0.3ML DOSE: CPT | Performed by: FAMILY MEDICINE

## 2021-05-18 RX ORDER — LEVETIRACETAM 250 MG/1
250 TABLET ORAL DAILY
Qty: 90 TABLET | Refills: 1 | Status: SHIPPED | OUTPATIENT
Start: 2021-05-18 | End: 2021-06-03 | Stop reason: SDUPTHER

## 2021-05-18 NOTE — TELEPHONE ENCOUNTER
Per patient's  please refill today (patient is out of the medication):     levETIRAcetam (KEPPRA) 250 MG tablet [[id number]]     Order Details  Dose: 250 mg Route: Oral Frequency: DAILY   Dispense Quantity: 90 tablet Refills: 1          Sig: Take 1 tablet by mouth daily         Start Date: 09/18/20 End Date: --   Written Date: 09/18/20 Expiration Date: 09/18/21   Original Order:  levETIRAcetam (KEPPRA) 250 MG tablet [[id number]]   Providers    Authorizing Provider: Severo Sanchez MD NPI: [id number]   Ordering User:  Jses Vanegas keara [facility] #[id number] - [id number] [phone] - F [phone]        Pharmacy info verified.    Last OV: 2/04/2021  Last labs: 10/26/2020

## 2021-06-01 RX ORDER — VERAPAMIL HYDROCHLORIDE 300 MG/1
CAPSULE, EXTENDED RELEASE ORAL
Qty: 90 CAPSULE | Refills: 1 | Status: SHIPPED | OUTPATIENT
Start: 2021-06-01 | End: 2021-06-03 | Stop reason: SDUPTHER

## 2021-06-01 NOTE — TELEPHONE ENCOUNTER
Pt  called in stating she has one pill left for tonight and then is completely out of it. Verapamil HCl  MG CP24 [[id number]]     Order Details  Dose, Route, Frequency: As Directed   Dispense Quantity: 90 capsule Refills: 1          Sig: TAKE 1 CAPSULE DAILY         Start Date: 09/18/20 End Date: --   Written Date: 09/18/20 Expiration Date: 09/18/21   Original Order:  Verapamil HCl  MG CP24 [facility] #[id number] - [phone] - F [phone]      Please advise, thank you!

## 2021-06-03 RX ORDER — METOPROLOL SUCCINATE 50 MG/1
TABLET, EXTENDED RELEASE ORAL
Qty: 90 TABLET | Refills: 1 | Status: SHIPPED | OUTPATIENT
Start: 2021-06-03 | End: 2021-11-18

## 2021-06-03 RX ORDER — VERAPAMIL HYDROCHLORIDE 300 MG/1
CAPSULE, EXTENDED RELEASE ORAL
Qty: 90 CAPSULE | Refills: 1 | Status: SHIPPED | OUTPATIENT
Start: 2021-06-03 | End: 2021-11-22

## 2021-06-03 RX ORDER — METOPROLOL SUCCINATE 50 MG/1
TABLET, EXTENDED RELEASE ORAL
Qty: 90 TABLET | Refills: 0 | Status: SHIPPED | OUTPATIENT
Start: 2021-06-03 | End: 2022-03-02 | Stop reason: SDUPTHER

## 2021-06-03 RX ORDER — LEVETIRACETAM 250 MG/1
250 TABLET ORAL DAILY
Qty: 90 TABLET | Refills: 1 | Status: SHIPPED | OUTPATIENT
Start: 2021-06-03 | End: 2021-11-22

## 2021-06-03 RX ORDER — CALCITRIOL 0.25 UG/1
CAPSULE, LIQUID FILLED ORAL
Qty: 270 CAPSULE | Refills: 0 | Status: SHIPPED | OUTPATIENT
Start: 2021-06-03

## 2021-06-03 RX ORDER — PHENOL 1.4 %
1 AEROSOL, SPRAY (ML) MUCOUS MEMBRANE DAILY
Qty: 90 TABLET | Refills: 1 | Status: SHIPPED | OUTPATIENT
Start: 2021-06-03 | End: 2022-03-02 | Stop reason: SDUPTHER

## 2021-06-03 RX ORDER — LEVETIRACETAM 250 MG/1
250 TABLET ORAL DAILY
Qty: 90 TABLET | Refills: 0 | Status: SHIPPED | OUTPATIENT
Start: 2021-06-03 | End: 2022-03-02 | Stop reason: SDUPTHER

## 2021-06-03 RX ORDER — CALCITRIOL 0.25 UG/1
CAPSULE, LIQUID FILLED ORAL
Qty: 270 CAPSULE | Refills: 1 | Status: SHIPPED | OUTPATIENT
Start: 2021-06-03 | End: 2021-11-18

## 2021-06-03 NOTE — TELEPHONE ENCOUNTER
Spouse called stating all of her medication needs to be changed to the mail in order service. Everything needs sent to [facility] mail delivery. Please advise, thank you!

## 2021-06-03 NOTE — TELEPHONE ENCOUNTER
All meds pended to [facility]    Requested Prescriptions     Pending Prescriptions Disp Refills    Verapamil HCl  MG CP24 90 capsule 1     Sig: TAKE 1 CAPSULE DAILY    levETIRAcetam (KEPPRA) 250 MG tablet 90 tablet 1     Sig: Take 1 tablet by mouth daily    magnesium oxide (MAGNESIUM-OXIDE) 400 (241.3 Mg) MG TABS tablet 60 tablet 5     Sig: TAKE 1 TABLET BY MOUTH TWICE DAILY    metoprolol succinate (TOPROL XL) 50 MG extended release tablet 90 tablet 1     Sig: TAKE 1 TABLET DAILY    calcitRIOL (ROCALTROL) 0.25 MCG capsule 270 capsule 1     Sig: TAKE 1 CAPSULE EVERY MORNING AND 2 CAPSULES EVERY EVENING    calcium carbonate 600 MG TABS tablet 90 tablet 1     Sig: Take 1 tablet by mouth daily    levETIRAcetam (KEPPRA) 250 MG tablet 90 tablet 0     Sig: Take 1 tablet by mouth daily    metoprolol succinate (TOPROL XL) 50 MG extended release tablet 90 tablet 0     Sig: TAKE 1 TABLET BY MOUTH EVERY DAY    calcitRIOL (ROCALTROL) 0.25 MCG capsule 270 capsule 0       LOV 2/4/2021  No f/u  Labs 10/26/20

## 2021-11-18 RX ORDER — CALCITRIOL 0.25 UG/1
CAPSULE, LIQUID FILLED ORAL
Qty: 270 CAPSULE | Refills: 1 | Status: SHIPPED | OUTPATIENT
Start: 2021-11-18 | End: 2022-03-02 | Stop reason: SDUPTHER

## 2021-11-18 RX ORDER — METOPROLOL SUCCINATE 50 MG/1
TABLET, EXTENDED RELEASE ORAL
Qty: 90 TABLET | Refills: 1 | Status: SHIPPED | OUTPATIENT
Start: 2021-11-18 | End: 2022-03-02 | Stop reason: SDUPTHER

## 2021-11-18 NOTE — TELEPHONE ENCOUNTER
Requested Prescriptions     Pending Prescriptions Disp Refills    magnesium oxide (MAG-OX) 400 (241.3 Mg) MG TABS tablet [Pharmacy Med Name: MAGNESIUM OXIDE 400 (241.3 Mg) MG Tablet] 180 tablet      Sig: TAKE 1 TABLET TWICE DAILY    calcitRIOL (ROCALTROL) 0.25 MCG capsule [Pharmacy Med Name: CALCITRIOL 0.25 MCG Capsule] 270 capsule 1     Sig: TAKE 1 CAPSULE EVERY MORNING AND TAKE 2 CAPSULES EVERY EVENING    metoprolol succinate (TOPROL XL) 50 MG extended release tablet [Pharmacy Med Name: METOPROLOL SUCCINATE ER 50 MG Tablet Extended Release 24 Hour] 90 tablet 1     Sig: TAKE 1 TABLET EVERY DAY     Last ov 2/4/2021  Last labs 10/26/20

## 2021-11-22 RX ORDER — LEVETIRACETAM 250 MG/1
TABLET ORAL
Qty: 90 TABLET | Refills: 1 | Status: SHIPPED | OUTPATIENT
Start: 2021-11-22 | End: 2022-03-02 | Stop reason: SDUPTHER

## 2021-11-22 RX ORDER — VERAPAMIL HYDROCHLORIDE 300 MG/1
CAPSULE, EXTENDED RELEASE ORAL
Qty: 90 CAPSULE | Refills: 1 | Status: SHIPPED | OUTPATIENT
Start: 2021-11-22 | End: 2022-03-02 | Stop reason: SDUPTHER

## 2021-11-22 NOTE — TELEPHONE ENCOUNTER
Requested Prescriptions     Pending Prescriptions Disp Refills    Verapamil HCl  MG CP24 [Pharmacy Med Name: VERAPAMIL HCL  MG Capsule Extended Release 24 Hour] 90 capsule 1     Sig: TAKE 1 CAPSULE EVERY DAY    levETIRAcetam (KEPPRA) 250 MG tablet [Pharmacy Med Name: LEVETIRACETAM 250 MG Tablet] 90 tablet 1     Sig: TAKE 1 TABLET EVERY DAY       lov 2/4/2021  No f/u  Labs 10/26/20

## 2021-12-20 ENCOUNTER — TELEPHONE (OUTPATIENT)
Dept: FAMILY MEDICINE CLINIC | Age: 46
End: 2021-12-20

## 2021-12-20 NOTE — TELEPHONE ENCOUNTER
Notification Pt Needed Follow Up Diagnostic Mammo/NOT COMPLETED 12/8/21 [facility] G[facility]av/[place]    Please contact pt and schedule a follow up Diagnostic Mammo. Document attached.

## 2021-12-21 NOTE — TELEPHONE ENCOUNTER
Left message for jess to return call.   Please relay message:    Pt Needed Follow Up Diagnostic Mammo/NOT COMPLETED 12/8/21 [facility]    Please have pt  call and schedule

## 2022-03-02 ENCOUNTER — OFFICE VISIT (OUTPATIENT)
Dept: FAMILY MEDICINE CLINIC | Age: 47
End: 2022-03-02
Payer: COMMERCIAL

## 2022-03-02 VITALS
SYSTOLIC BLOOD PRESSURE: 124 MMHG | RESPIRATION RATE: 12 BRPM | HEART RATE: 73 BPM | BODY MASS INDEX: 30.72 KG/M2 | OXYGEN SATURATION: 98 % | DIASTOLIC BLOOD PRESSURE: 86 MMHG | HEIGHT: 65 IN | WEIGHT: 184.4 LBS | TEMPERATURE: 97.3 F

## 2022-03-02 DIAGNOSIS — E20.9 HYPOPARATHYROIDISM, UNSPECIFIED HYPOPARATHYROIDISM TYPE (HCC): ICD-10-CM

## 2022-03-02 DIAGNOSIS — G91.1 OBSTRUCTIVE HYDROCEPHALUS (HCC): ICD-10-CM

## 2022-03-02 DIAGNOSIS — B35.1 ONYCHOMYCOSIS: ICD-10-CM

## 2022-03-02 DIAGNOSIS — R41.844 EXECUTIVE FUNCTION DEFICIT: ICD-10-CM

## 2022-03-02 DIAGNOSIS — E83.42 HYPOMAGNESEMIA: ICD-10-CM

## 2022-03-02 DIAGNOSIS — E83.51 HYPOCALCEMIA: ICD-10-CM

## 2022-03-02 DIAGNOSIS — I10 ESSENTIAL HYPERTENSION, BENIGN: Primary | ICD-10-CM

## 2022-03-02 DIAGNOSIS — R47.01 EXPRESSIVE APHASIA: ICD-10-CM

## 2022-03-02 DIAGNOSIS — I10 HYPERTENSION, ESSENTIAL, BENIGN: ICD-10-CM

## 2022-03-02 DIAGNOSIS — I63.9 CEREBRAL INFARCTION, UNSPECIFIED MECHANISM (HCC): ICD-10-CM

## 2022-03-02 DIAGNOSIS — M79.675 TOE PAIN, LEFT: ICD-10-CM

## 2022-03-02 DIAGNOSIS — R73.9 HYPERGLYCEMIA: ICD-10-CM

## 2022-03-02 DIAGNOSIS — Z79.899 HIGH RISK MEDICATION USE: ICD-10-CM

## 2022-03-02 DIAGNOSIS — Z12.11 SCREEN FOR COLON CANCER: ICD-10-CM

## 2022-03-02 DIAGNOSIS — R92.8 ABNORMAL MAMMOGRAM: ICD-10-CM

## 2022-03-02 PROCEDURE — G8484 FLU IMMUNIZE NO ADMIN: HCPCS | Performed by: FAMILY MEDICINE

## 2022-03-02 PROCEDURE — G8427 DOCREV CUR MEDS BY ELIG CLIN: HCPCS | Performed by: FAMILY MEDICINE

## 2022-03-02 PROCEDURE — G8417 CALC BMI ABV UP PARAM F/U: HCPCS | Performed by: FAMILY MEDICINE

## 2022-03-02 PROCEDURE — 1036F TOBACCO NON-USER: CPT | Performed by: FAMILY MEDICINE

## 2022-03-02 PROCEDURE — 99214 OFFICE O/P EST MOD 30 MIN: CPT | Performed by: FAMILY MEDICINE

## 2022-03-02 RX ORDER — METOPROLOL SUCCINATE 50 MG/1
TABLET, EXTENDED RELEASE ORAL
Qty: 90 TABLET | Refills: 0 | Status: SHIPPED | OUTPATIENT
Start: 2022-03-02

## 2022-03-02 RX ORDER — CALCITRIOL 0.25 UG/1
CAPSULE, LIQUID FILLED ORAL
Qty: 270 CAPSULE | Refills: 1 | Status: SHIPPED | OUTPATIENT
Start: 2022-03-02

## 2022-03-02 RX ORDER — LEVETIRACETAM 250 MG/1
TABLET ORAL
Qty: 90 TABLET | Refills: 1 | Status: SHIPPED | OUTPATIENT
Start: 2022-03-02 | End: 2022-10-09

## 2022-03-02 RX ORDER — METOPROLOL SUCCINATE 50 MG/1
TABLET, EXTENDED RELEASE ORAL
Qty: 90 TABLET | Refills: 1 | Status: SHIPPED | OUTPATIENT
Start: 2022-03-02

## 2022-03-02 RX ORDER — SULFAMETHOXAZOLE AND TRIMETHOPRIM 800; 160 MG/1; MG/1
1 TABLET ORAL 2 TIMES DAILY
Qty: 20 TABLET | Refills: 0 | Status: SHIPPED | OUTPATIENT
Start: 2022-03-02 | End: 2022-03-12

## 2022-03-02 RX ORDER — LEVETIRACETAM 250 MG/1
250 TABLET ORAL DAILY
Qty: 90 TABLET | Refills: 0 | Status: SHIPPED | OUTPATIENT
Start: 2022-03-02

## 2022-03-02 RX ORDER — VERAPAMIL HYDROCHLORIDE 300 MG/1
CAPSULE, EXTENDED RELEASE ORAL
Qty: 90 CAPSULE | Refills: 1 | Status: SHIPPED | OUTPATIENT
Start: 2022-03-02 | End: 2022-10-23

## 2022-03-02 RX ORDER — PHENOL 1.4 %
1 AEROSOL, SPRAY (ML) MUCOUS MEMBRANE DAILY
Qty: 90 TABLET | Refills: 1 | Status: SHIPPED | OUTPATIENT
Start: 2022-03-02

## 2022-03-02 RX ORDER — CALCITRIOL 0.25 UG/1
CAPSULE, LIQUID FILLED ORAL
Qty: 270 CAPSULE | Refills: 0 | Status: CANCELLED | OUTPATIENT
Start: 2022-03-02

## 2022-03-02 SDOH — ECONOMIC STABILITY: FOOD INSECURITY: WITHIN THE PAST 12 MONTHS, THE FOOD YOU BOUGHT JUST DIDN'T LAST AND YOU DIDN'T HAVE MONEY TO GET MORE.: NEVER TRUE

## 2022-03-02 SDOH — ECONOMIC STABILITY: FOOD INSECURITY: WITHIN THE PAST 12 MONTHS, YOU WORRIED THAT YOUR FOOD WOULD RUN OUT BEFORE YOU GOT MONEY TO BUY MORE.: NEVER TRUE

## 2022-03-02 ASSESSMENT — PATIENT HEALTH QUESTIONNAIRE - PHQ9
SUM OF ALL RESPONSES TO PHQ QUESTIONS 1-9: 0
2. FEELING DOWN, DEPRESSED OR HOPELESS: 0
SUM OF ALL RESPONSES TO PHQ QUESTIONS 1-9: 0
SUM OF ALL RESPONSES TO PHQ9 QUESTIONS 1 & 2: 0
SUM OF ALL RESPONSES TO PHQ QUESTIONS 1-9: 0
1. LITTLE INTEREST OR PLEASURE IN DOING THINGS: 0
SUM OF ALL RESPONSES TO PHQ QUESTIONS 1-9: 0

## 2022-03-02 ASSESSMENT — SOCIAL DETERMINANTS OF HEALTH (SDOH): HOW HARD IS IT FOR YOU TO PAY FOR THE VERY BASICS LIKE FOOD, HOUSING, MEDICAL CARE, AND HEATING?: NOT HARD AT ALL

## 2022-03-02 NOTE — PROGRESS NOTES
Here for f/u and recheck, pt has been doing ok but has been doing most everything at home. Due to pandemic, has not been getting out to see anyone recently. They did travel to her family a few weeks ago as she recently passed away. She is doing ok with the stress, did not have much of a reaction to the stressors. They have been doing well to stay healthy, and mood seems ok    Pt did develop some L toe pain, present for a few weeks. Does not remember any acute injury, trauma. Can ambuate ok, but is having some moderate pain, worse when bending. Did have some issues with toenails and wonders if related. Can walk with intermittent pain, but not all the time. No sx on R foot. Sx for a few weeks    Pt here for follow up of blood pressure. Pt states doing great with adherence to therapy and feels well. No issues of chest pain, shortness of breath. No vision changes, headache, swelling in legs. No acute issues related to hx of CVA. Pt is continuing with supportive therapy and is doing ok to stay active. Pt is very sedentary, sitting in front of the TV all the time.  is home all the time. Will occasionally get out to lunch, but not frequently. She does not do much in terms of communication/talking recently. Pt did do ok with interactions with family when she was at ,       Except as noted above in the history of present illness, the review of systems is  negative for headache, vision changes, chest pain, shortness of breath, abdominal pain, urinary sx, bowel changes. Past medical, surgical, and social history reviewed and updated  Medications and allergies reviewed and updated      O: /86   Pulse 73   Temp 97.3 °F (36.3 °C) (Temporal)   Resp 12   Ht 5' 5\" (1.651 m)   Wt 184 lb 6.4 oz (83.6 kg)   SpO2 98%   BMI 30.69 kg/m²   GEN: No acute distress, cooperative, well nourished, alert. HEENT: PEERLA, EOMI , normocephalic/atraumatic, nares and oropharynx clear.   Mucous membranes normal, Tympanic membranes clear bilaterally. Neck: soft, supple, no thyromegaly, mass, no Lymphadenopathy  CV: Regular rate and rhythm, no murmur, rubs, gallops. No edema. Resp: Clear to auscultation bilaterally good air entry bilaterally  No crackles, wheeze. Breathing comfortably. Psych: mood stable, No suicidal thoughts or ideation   Skin: L toe with mild medial erythema. Moderately onychomycotic nails bilaterally        ASSESSMENT / PLAN:    1. Essential hypertension, benign  blood pressure stable @ goal  controllled  Check bloodwork as below  - CBC; Future  - Comprehensive Metabolic Panel; Future  - Lipid Panel; Future  - Hemoglobin A1C; Future  - Phosphorus; Future    2. Obstructive hydrocephalus (HCC)  doin well, w/o acute sx  Has not seen neurology recently  [id number] [address] to monitor for neurologic symptoms    3. Hypoparathyroidism, unspecified hypoparathyroidism type ([address]  Stable w/o progressive sx  Cont to track electrolytes as below    4. Hyperglycemia  - Hemoglobin A1C; Future    5. Hypomagnesemia  - Magnesium; Future    6. Expressive aphasia  Chronic s/p CVA  Cont supportive therapy    7. Executive function deficit  S/p CVA  No acute sx    8. Hypertension, essential, benign  Stable @ goal    9. Hypocalcemia  - TSH; Future  - PTH, Intact; Future  - Phosphorus; Future    10. Abnormal mammogram  Due f/u mammo  Reviewed bx  - BEATRIZ DIGITAL DIAGNOSTIC W OR WO CAD LEFT; Future    11. Toe pain, left  C/w mild paronychia  tx with bactrim DS BID x 10d  Refer podiatry  Warm soaks encouraged    12. Cerebral infarction, unspecified mechanism ([address]  Asymptomatic  Cont max medical therapy    13. Onychomycosis  Refer podiatry  - External Referral To Podiatry    14. Screen for colon cancer  - Fecal DNA Colorectal cancer screening (Cologuard)    15. High risk medication use  - Levetiracetam Level;  Future           Follow-up appointment:   Pending bloodwork results    Discussed use, benefit, and side effects of all prescribed medications. Barriers to medication compliance addressed. All patient questions answered. Pt voiced understanding. When applicable, [facility]. The patient has signed appropriate paperworks/consents.

## 2022-03-14 DIAGNOSIS — R79.89 ELEVATED SERUM CREATININE: Primary | ICD-10-CM

## 2022-03-15 ENCOUNTER — TELEPHONE (OUTPATIENT)
Dept: FAMILY MEDICINE CLINIC | Age: 47
End: 2022-03-15

## 2022-03-15 NOTE — TELEPHONE ENCOUNTER
----- Message from Edith Saleh MD sent at 3/14/2022  9:07 PM EDT -----  bloodwork shows low PTH, mild increase in phosphorus and increase in creatinine to 1.7  We need to recheck the bloodwork and a urine for kidney irritation  Order in system  Notify pts

## 2022-04-06 DIAGNOSIS — R79.89 ELEVATED SERUM CREATININE: ICD-10-CM

## 2022-04-06 LAB
ANION GAP SERPL CALCULATED.3IONS-SCNC: 15 MMOL/L (ref 3–16)
BACTERIA: ABNORMAL /HPF
BILIRUBIN URINE: NEGATIVE
BLOOD, URINE: NEGATIVE
BUN BLDV-MCNC: 19 MG/DL (ref 7–20)
CALCIUM SERPL-MCNC: 7.7 MG/DL (ref 8.3–10.6)
CHLORIDE BLD-SCNC: 99 MMOL/L (ref 99–110)
CLARITY: CLEAR
CO2: 25 MMOL/L (ref 21–32)
COLOR: YELLOW
CREAT SERPL-MCNC: 1.3 MG/DL (ref 0.6–1.1)
CREATININE URINE: 62.3 MG/DL (ref 28–259)
EPITHELIAL CELLS, UA: 3 /HPF (ref 0–5)
GFR AFRICAN AMERICAN: 53
GFR NON-AFRICAN AMERICAN: 44
GLUCOSE BLD-MCNC: 91 MG/DL (ref 70–99)
GLUCOSE URINE: NEGATIVE MG/DL
HYALINE CASTS: 2 /LPF (ref 0–8)
KETONES, URINE: NEGATIVE MG/DL
LEUKOCYTE ESTERASE, URINE: ABNORMAL
MICROALBUMIN UR-MCNC: <1.2 MG/DL
MICROALBUMIN/CREAT UR-RTO: NORMAL MG/G (ref 0–30)
MICROSCOPIC EXAMINATION: YES
NITRITE, URINE: NEGATIVE
PH UA: 6.5 (ref 5–8)
POTASSIUM SERPL-SCNC: 4.2 MMOL/L (ref 3.5–5.1)
PROTEIN UA: NEGATIVE MG/DL
RBC UA: 12 /HPF (ref 0–4)
SODIUM BLD-SCNC: 139 MMOL/L (ref 136–145)
SPECIFIC GRAVITY UA: 1.01 (ref 1–1.03)
URINE TYPE: ABNORMAL
UROBILINOGEN, URINE: 0.2 E.U./DL
WBC UA: 2 /HPF (ref 0–5)

## 2022-04-08 LAB
ALBUMIN SERPL-MCNC: 3.6 G/DL (ref 3.1–4.9)
ALPHA-1-GLOBULIN: 0.3 G/DL (ref 0.2–0.4)
ALPHA-2-GLOBULIN: 1 G/DL (ref 0.4–1.1)
BETA GLOBULIN: 1.2 G/DL (ref 0.9–1.6)
GAMMA GLOBULIN: 1.5 G/DL (ref 0.6–1.8)
SPE/IFE INTERPRETATION: NORMAL
TOTAL PROTEIN: 7.5 G/DL (ref 6.4–8.2)

## 2022-05-06 ENCOUNTER — HOSPITAL ENCOUNTER (OUTPATIENT)
Dept: MAMMOGRAPHY | Age: 47
Discharge: HOME | End: 2022-05-06
Payer: COMMERCIAL

## 2022-05-06 ENCOUNTER — APPOINTMENT (OUTPATIENT)
Dept: ULTRASOUND IMAGING | Age: 47
End: 2022-05-06
Payer: COMMERCIAL

## 2022-05-06 VITALS — WEIGHT: 190 LBS | HEIGHT: 66 IN | BODY MASS INDEX: 30.53 KG/M2

## 2022-05-06 DIAGNOSIS — R92.8 ABNORMAL MAMMOGRAM: ICD-10-CM

## 2022-05-06 PROCEDURE — G0279 TOMOSYNTHESIS, MAMMO: HCPCS

## 2022-10-09 RX ORDER — LEVETIRACETAM 250 MG/1
TABLET ORAL
Qty: 90 TABLET | Refills: 1 | Status: SHIPPED | OUTPATIENT
Start: 2022-10-09

## 2022-10-23 RX ORDER — VERAPAMIL HYDROCHLORIDE 300 MG/1
CAPSULE, EXTENDED RELEASE ORAL
Qty: 90 CAPSULE | Refills: 1 | Status: SHIPPED | OUTPATIENT
Start: 2022-10-23

## 2022-12-19 RX ORDER — LANOLIN ALCOHOL/MO/W.PET/CERES
CREAM (GRAM) TOPICAL
Qty: 180 TABLET | Refills: 1 | Status: SHIPPED | OUTPATIENT
Start: 2022-12-19

## 2022-12-19 RX ORDER — METOPROLOL SUCCINATE 50 MG/1
TABLET, EXTENDED RELEASE ORAL
Qty: 90 TABLET | Refills: 1 | Status: SHIPPED | OUTPATIENT
Start: 2022-12-19

## 2022-12-19 RX ORDER — CALCITRIOL 0.25 UG/1
CAPSULE, LIQUID FILLED ORAL
Qty: 270 CAPSULE | Refills: 1 | Status: SHIPPED | OUTPATIENT
Start: 2022-12-19

## 2023-05-15 RX ORDER — LEVETIRACETAM 250 MG/1
TABLET ORAL
Qty: 90 TABLET | Refills: 1 | Status: SHIPPED | OUTPATIENT
Start: 2023-05-15

## 2023-05-16 ENCOUNTER — OFFICE VISIT (OUTPATIENT)
Dept: FAMILY MEDICINE CLINIC | Age: 48
End: 2023-05-16

## 2023-05-16 VITALS
BODY MASS INDEX: 28.77 KG/M2 | TEMPERATURE: 97.8 F | RESPIRATION RATE: 14 BRPM | HEART RATE: 71 BPM | DIASTOLIC BLOOD PRESSURE: 80 MMHG | HEIGHT: 66 IN | WEIGHT: 179 LBS | OXYGEN SATURATION: 100 % | SYSTOLIC BLOOD PRESSURE: 124 MMHG

## 2023-05-16 DIAGNOSIS — R73.9 HYPERGLYCEMIA: ICD-10-CM

## 2023-05-16 DIAGNOSIS — E83.51 HYPOCALCEMIA: ICD-10-CM

## 2023-05-16 DIAGNOSIS — E20.9 HYPOPARATHYROIDISM, UNSPECIFIED HYPOPARATHYROIDISM TYPE (HCC): ICD-10-CM

## 2023-05-16 DIAGNOSIS — G91.1 OBSTRUCTIVE HYDROCEPHALUS (HCC): ICD-10-CM

## 2023-05-16 DIAGNOSIS — I10 ESSENTIAL HYPERTENSION, BENIGN: ICD-10-CM

## 2023-05-16 DIAGNOSIS — Z12.4 SCREENING FOR CERVICAL CANCER: ICD-10-CM

## 2023-05-16 DIAGNOSIS — E83.42 HYPOMAGNESEMIA: ICD-10-CM

## 2023-05-16 DIAGNOSIS — I63.9 CEREBRAL INFARCTION, UNSPECIFIED MECHANISM (HCC): Primary | ICD-10-CM

## 2023-05-16 DIAGNOSIS — Z12.31 ENCOUNTER FOR SCREENING MAMMOGRAM FOR MALIGNANT NEOPLASM OF BREAST: ICD-10-CM

## 2023-05-16 DIAGNOSIS — Z12.11 SCREEN FOR COLON CANCER: ICD-10-CM

## 2023-05-16 RX ORDER — METOPROLOL SUCCINATE 50 MG/1
TABLET, EXTENDED RELEASE ORAL
Qty: 90 TABLET | Refills: 1 | Status: SHIPPED | OUTPATIENT
Start: 2023-05-16

## 2023-05-16 SDOH — ECONOMIC STABILITY: FOOD INSECURITY: WITHIN THE PAST 12 MONTHS, THE FOOD YOU BOUGHT JUST DIDN'T LAST AND YOU DIDN'T HAVE MONEY TO GET MORE.: NEVER TRUE

## 2023-05-16 SDOH — ECONOMIC STABILITY: FOOD INSECURITY: WITHIN THE PAST 12 MONTHS, YOU WORRIED THAT YOUR FOOD WOULD RUN OUT BEFORE YOU GOT MONEY TO BUY MORE.: NEVER TRUE

## 2023-05-16 SDOH — ECONOMIC STABILITY: INCOME INSECURITY: HOW HARD IS IT FOR YOU TO PAY FOR THE VERY BASICS LIKE FOOD, HOUSING, MEDICAL CARE, AND HEATING?: NOT HARD AT ALL

## 2023-05-16 SDOH — ECONOMIC STABILITY: HOUSING INSECURITY
IN THE LAST 12 MONTHS, WAS THERE A TIME WHEN YOU DID NOT HAVE A STEADY PLACE TO SLEEP OR SLEPT IN A SHELTER (INCLUDING NOW)?: NO

## 2023-05-16 ASSESSMENT — PATIENT HEALTH QUESTIONNAIRE - PHQ9
1. LITTLE INTEREST OR PLEASURE IN DOING THINGS: 0
SUM OF ALL RESPONSES TO PHQ9 QUESTIONS 1 & 2: 0
2. FEELING DOWN, DEPRESSED OR HOPELESS: 0
SUM OF ALL RESPONSES TO PHQ QUESTIONS 1-9: 0

## 2023-05-16 NOTE — PROGRESS NOTES
Here for f/u and recheck of mood, blood pressure, hx of CVA, obstructive hydrocephalus. Things seem to be overall stable without any acute neurologic changes that  has noticed. She does not communicate much, but they are not seeing any new issues except some intermittent drooling. She is not seeing neurology at this time, neurosurgery told them that they could come every 5 years. Has not seen any neurologists recently. Not seeing anyone else. Spends a lot of time watching TV, playing on tablet. Not doing much in terms of physical activity. Rarely smoking cigarettes, may take a puff off one of his. She does drink alcohol maybe 1x per week    She does have issues with bowel and bladder control. She does not wear depends but does have some accidents. Pt here for follow up of blood pressure. Pt states doing great with adherence to therapy and feels well. No issues of chest pain, shortness of breath. No vision changes, headache, swelling in legs. They do well to go to the bathroom every 3 hours regularly during the day and does well overnight. She does do a little laundry work, but states that she can't do a lot of the housework. She can fold laundry, empty . Of note, her mother did pass away about 1 year ago, and did go up to see family then but has not seen family since. Except as noted above in the history of present illness, the review of systems is  negative for headache, vision changes, chest pain, shortness of breath, abdominal pain, urinary sx, bowel changes. Past medical, surgical, and social history reviewed and updated  Medications and allergies reviewed and updated      O: /80   Pulse 71   Temp 97.8 °F (36.6 °C) (Temporal)   Resp 14   Ht 5' 6\" (1.676 m)   Wt 179 lb (81.2 kg)   SpO2 100%   BMI 28.89 kg/m²   GEN: No acute distress, cooperative, well nourished, alert.    HEENT: PEERLA, EOMI , normocephalic/atraumatic, nares and oropharynx

## 2023-05-17 ENCOUNTER — PATIENT MESSAGE (OUTPATIENT)
Dept: FAMILY MEDICINE CLINIC | Age: 48
End: 2023-05-17

## 2023-05-17 DIAGNOSIS — Z12.11 SCREEN FOR COLON CANCER: Primary | ICD-10-CM

## 2023-05-18 NOTE — TELEPHONE ENCOUNTER
From: Rere Calderon  To: Dr. Maddy Carr  Sent: 2023 6:46 PM EDT  Subject: Lia How test    I found out what happened with [employer] and [employer] Cologuard test kit:  Since Medicare's guidelines for patients under 50 for this test recently changed to 39 or over for Cologuard, even though the prescription was about or over a year old, they approved it this year and [employer] sent the kit. When we sent the sample back a couple weeks ago, since the prescription was  the sample was not processed and destroyed. So if Paul Ra wants to re-write the cologuard test for dilma they will send another one and I get to play with her poop again.

## 2023-05-24 ENCOUNTER — PATIENT MESSAGE (OUTPATIENT)
Dept: FAMILY MEDICINE CLINIC | Age: 48
End: 2023-05-24

## 2023-05-25 NOTE — TELEPHONE ENCOUNTER
From: Jeri Quintero  To: Dr. Fracisco Mauro  Sent: 2023 11:29 PM EDT  Subject: Handicap placard renewal    Another main reason we wanted to visit Dr Shruthi Love was that [employer]'s handicap placards . Do we really need to schedule another appointment for just that?

## 2023-05-29 RX ORDER — VERAPAMIL HYDROCHLORIDE 300 MG/1
CAPSULE, EXTENDED RELEASE ORAL
Qty: 90 CAPSULE | Refills: 1 | Status: SHIPPED | OUTPATIENT
Start: 2023-05-29

## 2023-05-30 ENCOUNTER — HOSPITAL ENCOUNTER (OUTPATIENT)
Dept: MAMMOGRAPHY | Age: 48
Discharge: HOME | End: 2023-05-30
Payer: COMMERCIAL

## 2023-05-30 VITALS — HEIGHT: 66 IN | WEIGHT: 179 LBS | BODY MASS INDEX: 28.77 KG/M2

## 2023-05-30 DIAGNOSIS — I10 ESSENTIAL HYPERTENSION, BENIGN: ICD-10-CM

## 2023-05-30 DIAGNOSIS — R73.9 HYPERGLYCEMIA: ICD-10-CM

## 2023-05-30 DIAGNOSIS — Z12.31 ENCOUNTER FOR SCREENING MAMMOGRAM FOR MALIGNANT NEOPLASM OF BREAST: ICD-10-CM

## 2023-05-30 DIAGNOSIS — E20.9 HYPOPARATHYROIDISM, UNSPECIFIED HYPOPARATHYROIDISM TYPE (HCC): ICD-10-CM

## 2023-05-30 PROCEDURE — 77063 BREAST TOMOSYNTHESIS BI: CPT

## 2023-05-31 LAB
ALBUMIN SERPL-MCNC: 4.7 G/DL (ref 3.4–5)
ALBUMIN/GLOB SERPL: 1.7 {RATIO} (ref 1.1–2.2)
ALP SERPL-CCNC: 61 U/L (ref 40–129)
ALT SERPL-CCNC: 7 U/L (ref 10–40)
ANION GAP SERPL CALCULATED.3IONS-SCNC: 16 MMOL/L (ref 3–16)
AST SERPL-CCNC: 9 U/L (ref 15–37)
BILIRUB SERPL-MCNC: 0.4 MG/DL (ref 0–1)
BUN SERPL-MCNC: 30 MG/DL (ref 7–20)
CALCIUM SERPL-MCNC: 8.4 MG/DL (ref 8.3–10.6)
CHLORIDE SERPL-SCNC: 101 MMOL/L (ref 99–110)
CHOLEST SERPL-MCNC: 228 MG/DL (ref 0–199)
CO2 SERPL-SCNC: 25 MMOL/L (ref 21–32)
CREAT SERPL-MCNC: 1.2 MG/DL (ref 0.6–1.1)
DEPRECATED RDW RBC AUTO: 14.5 % (ref 12.4–15.4)
EST. AVERAGE GLUCOSE BLD GHB EST-MCNC: 119.8 MG/DL
GFR SERPLBLD CREATININE-BSD FMLA CKD-EPI: 55 ML/MIN/{1.73_M2}
GLUCOSE SERPL-MCNC: 84 MG/DL (ref 70–99)
HBA1C MFR BLD: 5.8 %
HCT VFR BLD AUTO: 40.8 % (ref 36–48)
HDLC SERPL-MCNC: 63 MG/DL (ref 40–60)
HGB BLD-MCNC: 13.6 G/DL (ref 12–16)
LDLC SERPL CALC-MCNC: 142 MG/DL
MAGNESIUM SERPL-MCNC: 1.9 MG/DL (ref 1.8–2.4)
MCH RBC QN AUTO: 29.3 PG (ref 26–34)
MCHC RBC AUTO-ENTMCNC: 33.4 G/DL (ref 31–36)
MCV RBC AUTO: 87.5 FL (ref 80–100)
PLATELET # BLD AUTO: 214 K/UL (ref 135–450)
PMV BLD AUTO: 10.2 FL (ref 5–10.5)
POTASSIUM SERPL-SCNC: 4.3 MMOL/L (ref 3.5–5.1)
PROT SERPL-MCNC: 7.4 G/DL (ref 6.4–8.2)
PTH-INTACT SERPL-MCNC: 4.1 PG/ML (ref 14–72)
RBC # BLD AUTO: 4.66 M/UL (ref 4–5.2)
SODIUM SERPL-SCNC: 142 MMOL/L (ref 136–145)
TRIGL SERPL-MCNC: 113 MG/DL (ref 0–150)
TSH SERPL DL<=0.005 MIU/L-ACNC: 0.63 UIU/ML (ref 0.27–4.2)
VLDLC SERPL CALC-MCNC: 23 MG/DL
WBC # BLD AUTO: 6.9 K/UL (ref 4–11)

## 2023-06-23 LAB — NONINV COLON CA DNA+OCC BLD SCRN STL QL: NEGATIVE

## 2023-07-10 ENCOUNTER — OFFICE VISIT (OUTPATIENT)
Dept: GYNECOLOGY | Age: 48
End: 2023-07-10
Payer: MEDICARE

## 2023-07-10 VITALS
BODY MASS INDEX: 28.38 KG/M2 | RESPIRATION RATE: 17 BRPM | DIASTOLIC BLOOD PRESSURE: 80 MMHG | HEART RATE: 62 BPM | SYSTOLIC BLOOD PRESSURE: 124 MMHG | HEIGHT: 66 IN | OXYGEN SATURATION: 97 % | WEIGHT: 176.6 LBS

## 2023-07-10 DIAGNOSIS — Z01.419 WELL WOMAN EXAM WITH ROUTINE GYNECOLOGICAL EXAM: Primary | ICD-10-CM

## 2023-07-10 PROCEDURE — 3079F DIAST BP 80-89 MM HG: CPT | Performed by: OBSTETRICS & GYNECOLOGY

## 2023-07-10 PROCEDURE — 3074F SYST BP LT 130 MM HG: CPT | Performed by: OBSTETRICS & GYNECOLOGY

## 2023-07-10 PROCEDURE — G0101 CA SCREEN;PELVIC/BREAST EXAM: HCPCS | Performed by: OBSTETRICS & GYNECOLOGY

## 2023-07-10 ASSESSMENT — ENCOUNTER SYMPTOMS
GASTROINTESTINAL NEGATIVE: 1
RESPIRATORY NEGATIVE: 1
EYES NEGATIVE: 1
ALLERGIC/IMMUNOLOGIC NEGATIVE: 1

## 2023-07-11 NOTE — PROGRESS NOTES
Neurological:      Mental Status: She is alert and oriented to person, place, and time. Mental status is at baseline. Deep Tendon Reflexes: Reflexes are normal and symmetric. Psychiatric:         Mood and Affect: Mood normal.         Behavior: Behavior normal.         Judgment: Judgment normal.       Assessment:      Annual  Vulvar irritation        Plan:      Pap, calcium, exercise, mammogram  No rash seen but if continues, consider nystatin powder.  Can call if needs   involved with care of patient        Rudolph Fabian MD

## 2023-09-26 RX ORDER — METOPROLOL SUCCINATE 50 MG/1
50 TABLET, EXTENDED RELEASE ORAL DAILY
Qty: 90 TABLET | Refills: 1 | Status: CANCELLED | OUTPATIENT
Start: 2023-09-26

## 2023-09-26 RX ORDER — CALCITRIOL 0.25 UG/1
CAPSULE, LIQUID FILLED ORAL
Qty: 270 CAPSULE | Refills: 1 | Status: CANCELLED | OUTPATIENT
Start: 2023-09-26

## 2023-09-26 RX ORDER — LEVETIRACETAM 250 MG/1
250 TABLET ORAL DAILY
Qty: 90 TABLET | Refills: 1 | Status: CANCELLED | OUTPATIENT
Start: 2023-09-26

## 2023-09-26 RX ORDER — LEVETIRACETAM 250 MG/1
250 TABLET ORAL DAILY
Qty: 90 TABLET | Refills: 0 | Status: CANCELLED | OUTPATIENT
Start: 2023-09-26

## 2023-09-27 RX ORDER — CALCITRIOL 0.25 UG/1
CAPSULE, LIQUID FILLED ORAL
Qty: 270 CAPSULE | Refills: 1 | Status: SHIPPED | OUTPATIENT
Start: 2023-09-27

## 2023-09-27 RX ORDER — VERAPAMIL HYDROCHLORIDE 300 MG/1
300 CAPSULE, EXTENDED RELEASE ORAL DAILY
Qty: 90 CAPSULE | Refills: 1 | Status: SHIPPED | OUTPATIENT
Start: 2023-09-27

## 2023-09-27 RX ORDER — METOPROLOL SUCCINATE 50 MG/1
TABLET, EXTENDED RELEASE ORAL
Qty: 90 TABLET | Refills: 1 | Status: CANCELLED | OUTPATIENT
Start: 2023-09-27

## 2023-09-27 RX ORDER — LANOLIN ALCOHOL/MO/W.PET/CERES
CREAM (GRAM) TOPICAL
Qty: 180 TABLET | Refills: 1 | Status: CANCELLED | OUTPATIENT
Start: 2023-09-27

## 2023-09-27 RX ORDER — CALCITRIOL 0.25 UG/1
CAPSULE, LIQUID FILLED ORAL
Qty: 270 CAPSULE | Refills: 1 | Status: CANCELLED | OUTPATIENT
Start: 2023-09-27

## 2023-09-27 RX ORDER — LEVETIRACETAM 250 MG/1
250 TABLET ORAL DAILY
Qty: 90 TABLET | Refills: 1 | Status: SHIPPED | OUTPATIENT
Start: 2023-09-27

## 2023-09-27 RX ORDER — METOPROLOL SUCCINATE 50 MG/1
50 TABLET, EXTENDED RELEASE ORAL DAILY
Qty: 90 TABLET | Refills: 1 | Status: CANCELLED | OUTPATIENT
Start: 2023-09-27

## 2023-09-27 RX ORDER — LEVETIRACETAM 250 MG/1
250 TABLET ORAL DAILY
Qty: 90 TABLET | Refills: 1 | Status: CANCELLED | OUTPATIENT
Start: 2023-09-27

## 2023-09-27 RX ORDER — VERAPAMIL HYDROCHLORIDE 300 MG/1
300 CAPSULE, EXTENDED RELEASE ORAL DAILY
Qty: 90 CAPSULE | Refills: 1 | Status: CANCELLED | OUTPATIENT
Start: 2023-09-27

## 2023-09-27 RX ORDER — LANOLIN ALCOHOL/MO/W.PET/CERES
CREAM (GRAM) TOPICAL
Qty: 180 TABLET | Refills: 1 | Status: SHIPPED | OUTPATIENT
Start: 2023-09-27

## 2023-09-27 RX ORDER — METOPROLOL SUCCINATE 50 MG/1
TABLET, EXTENDED RELEASE ORAL
Qty: 90 TABLET | Refills: 1 | Status: SHIPPED | OUTPATIENT
Start: 2023-09-27

## 2023-09-27 NOTE — TELEPHONE ENCOUNTER
Requested Prescriptions     Pending Prescriptions Disp Refills    calcitRIOL (ROCALTROL) 0.25 MCG capsule 270 capsule 0     Sig: TAKE ONE CAPSULE BY MOUTH EVERY MORNING AND TWO CAPSULES EVERY EVENING    magnesium oxide (MAG-OX) 400 (240 Mg) MG tablet 180 tablet 1     Sig: TAKE 1 TABLET TWICE DAILY    levETIRAcetam (KEPPRA) 250 MG tablet 90 tablet 1     Sig: Take 1 tablet by mouth daily    metoprolol succinate (TOPROL XL) 50 MG extended release tablet 90 tablet 1     Sig: TAKE 1 TABLET BY MOUTH EVERY DAY    Verapamil (VERELAN) 300 MG CP24 extended release capsule 90 capsule 1     Sig: Take 1 capsule by mouth daily          Last Office Visit: 5/16/2023     Next Office Visit: Visit date not found     Last Labs: 5/30/23

## 2024-02-05 RX ORDER — METOPROLOL SUCCINATE 50 MG/1
TABLET, EXTENDED RELEASE ORAL
Qty: 90 TABLET | Refills: 3 | Status: SHIPPED | OUTPATIENT
Start: 2024-02-05

## 2024-02-05 RX ORDER — CALCITRIOL 0.25 UG/1
CAPSULE, LIQUID FILLED ORAL
Qty: 270 CAPSULE | Refills: 3 | Status: SHIPPED | OUTPATIENT
Start: 2024-02-05

## 2024-04-25 ENCOUNTER — HOSPITAL ENCOUNTER (INPATIENT)
Age: 49
LOS: 2 days | Discharge: HOME | DRG: 690 | End: 2024-04-28
Attending: EMERGENCY MEDICINE | Admitting: INTERNAL MEDICINE
Payer: COMMERCIAL

## 2024-04-25 ENCOUNTER — APPOINTMENT (OUTPATIENT)
Dept: GENERAL RADIOLOGY | Age: 49
DRG: 690 | End: 2024-04-25
Payer: COMMERCIAL

## 2024-04-25 DIAGNOSIS — E07.9 THYROID MASS: ICD-10-CM

## 2024-04-25 DIAGNOSIS — N12 PYELONEPHRITIS: Primary | ICD-10-CM

## 2024-04-25 LAB
ALBUMIN SERPL-MCNC: 4.3 G/DL (ref 3.4–5)
ALP SERPL-CCNC: 66 U/L (ref 40–129)
ALT SERPL-CCNC: 7 U/L (ref 10–40)
ANION GAP SERPL CALCULATED.3IONS-SCNC: 13 MMOL/L (ref 3–16)
AST SERPL-CCNC: 12 U/L (ref 15–37)
BASE EXCESS BLDV CALC-SCNC: -5.5 MMOL/L (ref -2–3)
BASOPHILS # BLD: 0 K/UL (ref 0–0.2)
BASOPHILS NFR BLD: 0.3 %
BILIRUB DIRECT SERPL-MCNC: <0.2 MG/DL (ref 0–0.3)
BILIRUB INDIRECT SERPL-MCNC: ABNORMAL MG/DL (ref 0–1)
BILIRUB SERPL-MCNC: 0.3 MG/DL (ref 0–1)
BUN SERPL-MCNC: 35 MG/DL (ref 7–20)
CALCIUM SERPL-MCNC: 8.6 MG/DL (ref 8.3–10.6)
CHLORIDE SERPL-SCNC: 103 MMOL/L (ref 99–110)
CO2 BLDV-SCNC: 20 MMOL/L
CO2 SERPL-SCNC: 26 MMOL/L (ref 21–32)
COHGB MFR BLDV: 2 % (ref 0–1.5)
CREAT SERPL-MCNC: 1.6 MG/DL (ref 0.6–1.1)
DEPRECATED RDW RBC AUTO: 13.6 % (ref 12.4–15.4)
DO-HGB MFR BLDV: 8.8 %
EOSINOPHIL # BLD: 0.1 K/UL (ref 0–0.6)
EOSINOPHIL NFR BLD: 0.6 %
FLUAV RNA RESP QL NAA+PROBE: NOT DETECTED
FLUBV RNA RESP QL NAA+PROBE: NOT DETECTED
GFR SERPLBLD CREATININE-BSD FMLA CKD-EPI: 39 ML/MIN/{1.73_M2}
GLUCOSE SERPL-MCNC: 183 MG/DL (ref 70–99)
HCO3 BLDV-SCNC: 18.8 MMOL/L (ref 24–28)
HCT VFR BLD AUTO: 40.6 % (ref 36–48)
HGB BLD-MCNC: 13.5 G/DL (ref 12–16)
LACTATE BLDV-SCNC: 1 MMOL/L (ref 0.4–1.9)
LIPASE SERPL-CCNC: 27 U/L (ref 13–60)
LYMPHOCYTES # BLD: 1.8 K/UL (ref 1–5.1)
LYMPHOCYTES NFR BLD: 15.3 %
MAGNESIUM SERPL-MCNC: 1.8 MG/DL (ref 1.8–2.4)
MCH RBC QN AUTO: 29.1 PG (ref 26–34)
MCHC RBC AUTO-ENTMCNC: 33.4 G/DL (ref 31–36)
MCV RBC AUTO: 87.1 FL (ref 80–100)
METHGB MFR BLDV: 0.6 % (ref 0–1.5)
MONOCYTES # BLD: 0.9 K/UL (ref 0–1.3)
MONOCYTES NFR BLD: 7.7 %
NEUTROPHILS # BLD: 8.8 K/UL (ref 1.7–7.7)
NEUTROPHILS NFR BLD: 76.1 %
PCO2 BLDV: 31.2 MMHG (ref 41–51)
PH BLDV: 7.39 [PH] (ref 7.35–7.45)
PLATELET # BLD AUTO: 226 K/UL (ref 135–450)
PMV BLD AUTO: 9.9 FL (ref 5–10.5)
PO2 BLDV: 58.1 MMHG (ref 25–40)
POTASSIUM SERPL-SCNC: 4.1 MMOL/L (ref 3.5–5.1)
PROT SERPL-MCNC: 7.9 G/DL (ref 6.4–8.2)
RBC # BLD AUTO: 4.66 M/UL (ref 4–5.2)
SAO2 % BLDV: 91 %
SARS-COV-2 RNA RESP QL NAA+PROBE: NOT DETECTED
SODIUM SERPL-SCNC: 142 MMOL/L (ref 136–145)
TROPONIN, HIGH SENSITIVITY: 10 NG/L (ref 0–14)
WBC # BLD AUTO: 11.6 K/UL (ref 4–11)

## 2024-04-25 PROCEDURE — 83690 ASSAY OF LIPASE: CPT

## 2024-04-25 PROCEDURE — 82803 BLOOD GASES ANY COMBINATION: CPT

## 2024-04-25 PROCEDURE — 85025 COMPLETE CBC W/AUTO DIFF WBC: CPT

## 2024-04-25 PROCEDURE — 80048 BASIC METABOLIC PNL TOTAL CA: CPT

## 2024-04-25 PROCEDURE — 83605 ASSAY OF LACTIC ACID: CPT

## 2024-04-25 PROCEDURE — 84484 ASSAY OF TROPONIN QUANT: CPT

## 2024-04-25 PROCEDURE — 93005 ELECTROCARDIOGRAM TRACING: CPT | Performed by: STUDENT IN AN ORGANIZED HEALTH CARE EDUCATION/TRAINING PROGRAM

## 2024-04-25 PROCEDURE — 83735 ASSAY OF MAGNESIUM: CPT

## 2024-04-25 PROCEDURE — 96374 THER/PROPH/DIAG INJ IV PUSH: CPT

## 2024-04-25 PROCEDURE — 99285 EMERGENCY DEPT VISIT HI MDM: CPT

## 2024-04-25 PROCEDURE — 87636 SARSCOV2 & INF A&B AMP PRB: CPT

## 2024-04-25 PROCEDURE — 70250 X-RAY EXAM OF SKULL: CPT

## 2024-04-25 PROCEDURE — 36415 COLL VENOUS BLD VENIPUNCTURE: CPT

## 2024-04-25 PROCEDURE — 80076 HEPATIC FUNCTION PANEL: CPT

## 2024-04-25 ASSESSMENT — PAIN SCALES - GENERAL: PAINLEVEL_OUTOF10: 10

## 2024-04-25 ASSESSMENT — PAIN DESCRIPTION - DESCRIPTORS: DESCRIPTORS: ACHING

## 2024-04-25 ASSESSMENT — PAIN - FUNCTIONAL ASSESSMENT: PAIN_FUNCTIONAL_ASSESSMENT: 0-10

## 2024-04-25 ASSESSMENT — PAIN DESCRIPTION - LOCATION: LOCATION: ABDOMEN

## 2024-04-25 ASSESSMENT — PAIN DESCRIPTION - ORIENTATION: ORIENTATION: LEFT

## 2024-04-26 ENCOUNTER — APPOINTMENT (OUTPATIENT)
Dept: CT IMAGING | Age: 49
DRG: 690 | End: 2024-04-26
Payer: COMMERCIAL

## 2024-04-26 PROBLEM — N12 PYELONEPHRITIS: Status: ACTIVE | Noted: 2024-04-26

## 2024-04-26 LAB
BACTERIA URNS QL MICRO: ABNORMAL /HPF
BILIRUB UR QL STRIP.AUTO: NEGATIVE
CLARITY UR: CLEAR
COLOR UR: YELLOW
EKG ATRIAL RATE: 78 BPM
EKG DIAGNOSIS: NORMAL
EKG P AXIS: 47 DEGREES
EKG P-R INTERVAL: 150 MS
EKG Q-T INTERVAL: 416 MS
EKG QRS DURATION: 88 MS
EKG QTC CALCULATION (BAZETT): 474 MS
EKG R AXIS: -31 DEGREES
EKG T AXIS: 16 DEGREES
EKG VENTRICULAR RATE: 78 BPM
EPI CELLS #/AREA URNS HPF: ABNORMAL /HPF (ref 0–5)
GLUCOSE UR STRIP.AUTO-MCNC: NEGATIVE MG/DL
HGB UR QL STRIP.AUTO: NEGATIVE
KETONES UR STRIP.AUTO-MCNC: NEGATIVE MG/DL
LEUKOCYTE ESTERASE UR QL STRIP.AUTO: ABNORMAL
NITRITE UR QL STRIP.AUTO: POSITIVE
PH UR STRIP.AUTO: 6 [PH] (ref 5–8)
PROT UR STRIP.AUTO-MCNC: NEGATIVE MG/DL
RBC #/AREA URNS HPF: ABNORMAL /HPF (ref 0–4)
SP GR UR STRIP.AUTO: 1.02 (ref 1–1.03)
TROPONIN, HIGH SENSITIVITY: 10 NG/L (ref 0–14)
UA COMPLETE W REFLEX CULTURE PNL UR: YES
UA DIPSTICK W REFLEX MICRO PNL UR: YES
URN SPEC COLLECT METH UR: ABNORMAL
UROBILINOGEN UR STRIP-ACNC: 0.2 E.U./DL
WBC #/AREA URNS HPF: ABNORMAL /HPF (ref 0–5)

## 2024-04-26 PROCEDURE — 71260 CT THORAX DX C+: CPT

## 2024-04-26 PROCEDURE — 6360000004 HC RX CONTRAST MEDICATION: Performed by: STUDENT IN AN ORGANIZED HEALTH CARE EDUCATION/TRAINING PROGRAM

## 2024-04-26 PROCEDURE — 6360000002 HC RX W HCPCS: Performed by: STUDENT IN AN ORGANIZED HEALTH CARE EDUCATION/TRAINING PROGRAM

## 2024-04-26 PROCEDURE — 84484 ASSAY OF TROPONIN QUANT: CPT

## 2024-04-26 PROCEDURE — 87088 URINE BACTERIA CULTURE: CPT

## 2024-04-26 PROCEDURE — 6360000002 HC RX W HCPCS: Performed by: INTERNAL MEDICINE

## 2024-04-26 PROCEDURE — 6370000000 HC RX 637 (ALT 250 FOR IP): Performed by: INTERNAL MEDICINE

## 2024-04-26 PROCEDURE — 81001 URINALYSIS AUTO W/SCOPE: CPT

## 2024-04-26 PROCEDURE — 74177 CT ABD & PELVIS W/CONTRAST: CPT

## 2024-04-26 PROCEDURE — 2580000003 HC RX 258: Performed by: INTERNAL MEDICINE

## 2024-04-26 PROCEDURE — 2580000003 HC RX 258: Performed by: STUDENT IN AN ORGANIZED HEALTH CARE EDUCATION/TRAINING PROGRAM

## 2024-04-26 PROCEDURE — 87186 SC STD MICRODIL/AGAR DIL: CPT

## 2024-04-26 PROCEDURE — 87086 URINE CULTURE/COLONY COUNT: CPT

## 2024-04-26 PROCEDURE — 2060000000 HC ICU INTERMEDIATE R&B

## 2024-04-26 RX ORDER — SODIUM CHLORIDE 9 MG/ML
INJECTION, SOLUTION INTRAVENOUS PRN
Status: DISCONTINUED | OUTPATIENT
Start: 2024-04-26 | End: 2024-04-28 | Stop reason: HOSPADM

## 2024-04-26 RX ORDER — METOPROLOL SUCCINATE 50 MG/1
50 TABLET, EXTENDED RELEASE ORAL DAILY
Status: DISCONTINUED | OUTPATIENT
Start: 2024-04-26 | End: 2024-04-28 | Stop reason: HOSPADM

## 2024-04-26 RX ORDER — POTASSIUM CHLORIDE 20 MEQ/1
40 TABLET, EXTENDED RELEASE ORAL PRN
Status: DISCONTINUED | OUTPATIENT
Start: 2024-04-26 | End: 2024-04-28 | Stop reason: HOSPADM

## 2024-04-26 RX ORDER — SODIUM CHLORIDE 9 MG/ML
INJECTION, SOLUTION INTRAVENOUS CONTINUOUS
Status: ACTIVE | OUTPATIENT
Start: 2024-04-26 | End: 2024-04-28

## 2024-04-26 RX ORDER — LANOLIN ALCOHOL/MO/W.PET/CERES
400 CREAM (GRAM) TOPICAL DAILY
Status: DISCONTINUED | OUTPATIENT
Start: 2024-04-26 | End: 2024-04-28 | Stop reason: HOSPADM

## 2024-04-26 RX ORDER — SODIUM CHLORIDE 0.9 % (FLUSH) 0.9 %
5-40 SYRINGE (ML) INJECTION PRN
Status: DISCONTINUED | OUTPATIENT
Start: 2024-04-26 | End: 2024-04-28 | Stop reason: HOSPADM

## 2024-04-26 RX ORDER — VERAPAMIL HYDROCHLORIDE 240 MG/1
240 TABLET, FILM COATED, EXTENDED RELEASE ORAL DAILY
Status: DISCONTINUED | OUTPATIENT
Start: 2024-04-26 | End: 2024-04-28 | Stop reason: HOSPADM

## 2024-04-26 RX ORDER — ENOXAPARIN SODIUM 100 MG/ML
40 INJECTION SUBCUTANEOUS DAILY
Status: DISCONTINUED | OUTPATIENT
Start: 2024-04-26 | End: 2024-04-28 | Stop reason: HOSPADM

## 2024-04-26 RX ORDER — ONDANSETRON 2 MG/ML
4 INJECTION INTRAMUSCULAR; INTRAVENOUS EVERY 6 HOURS PRN
Status: DISCONTINUED | OUTPATIENT
Start: 2024-04-26 | End: 2024-04-28 | Stop reason: HOSPADM

## 2024-04-26 RX ORDER — SODIUM CHLORIDE 0.9 % (FLUSH) 0.9 %
5-40 SYRINGE (ML) INJECTION EVERY 12 HOURS SCHEDULED
Status: DISCONTINUED | OUTPATIENT
Start: 2024-04-26 | End: 2024-04-28 | Stop reason: HOSPADM

## 2024-04-26 RX ORDER — LEVETIRACETAM 500 MG/1
250 TABLET ORAL DAILY
Status: DISCONTINUED | OUTPATIENT
Start: 2024-04-26 | End: 2024-04-28 | Stop reason: HOSPADM

## 2024-04-26 RX ORDER — MAGNESIUM SULFATE IN WATER 40 MG/ML
2000 INJECTION, SOLUTION INTRAVENOUS PRN
Status: DISCONTINUED | OUTPATIENT
Start: 2024-04-26 | End: 2024-04-28 | Stop reason: HOSPADM

## 2024-04-26 RX ORDER — POTASSIUM CHLORIDE 7.45 MG/ML
10 INJECTION INTRAVENOUS PRN
Status: DISCONTINUED | OUTPATIENT
Start: 2024-04-26 | End: 2024-04-28 | Stop reason: HOSPADM

## 2024-04-26 RX ORDER — ONDANSETRON 4 MG/1
4 TABLET, ORALLY DISINTEGRATING ORAL EVERY 8 HOURS PRN
Status: DISCONTINUED | OUTPATIENT
Start: 2024-04-26 | End: 2024-04-28 | Stop reason: HOSPADM

## 2024-04-26 RX ORDER — POLYETHYLENE GLYCOL 3350 17 G/17G
17 POWDER, FOR SOLUTION ORAL DAILY PRN
Status: DISCONTINUED | OUTPATIENT
Start: 2024-04-26 | End: 2024-04-28 | Stop reason: HOSPADM

## 2024-04-26 RX ORDER — CALCITRIOL 0.25 UG/1
0.5 CAPSULE, LIQUID FILLED ORAL NIGHTLY
Status: DISCONTINUED | OUTPATIENT
Start: 2024-04-26 | End: 2024-04-28 | Stop reason: HOSPADM

## 2024-04-26 RX ORDER — CALCITRIOL 0.25 UG/1
0.25 CAPSULE, LIQUID FILLED ORAL DAILY
Status: DISCONTINUED | OUTPATIENT
Start: 2024-04-26 | End: 2024-04-28 | Stop reason: HOSPADM

## 2024-04-26 RX ORDER — ACETAMINOPHEN 650 MG/1
650 SUPPOSITORY RECTAL EVERY 6 HOURS PRN
Status: DISCONTINUED | OUTPATIENT
Start: 2024-04-26 | End: 2024-04-28 | Stop reason: HOSPADM

## 2024-04-26 RX ORDER — ACETAMINOPHEN 325 MG/1
650 TABLET ORAL EVERY 6 HOURS PRN
Status: DISCONTINUED | OUTPATIENT
Start: 2024-04-26 | End: 2024-04-28 | Stop reason: HOSPADM

## 2024-04-26 RX ADMIN — IOPAMIDOL 75 ML: 755 INJECTION, SOLUTION INTRAVENOUS at 00:52

## 2024-04-26 RX ADMIN — WATER 1000 MG: 1 INJECTION INTRAMUSCULAR; INTRAVENOUS; SUBCUTANEOUS at 01:36

## 2024-04-26 RX ADMIN — SODIUM CHLORIDE, PRESERVATIVE FREE 10 ML: 5 INJECTION INTRAVENOUS at 19:58

## 2024-04-26 RX ADMIN — SODIUM CHLORIDE: 9 INJECTION, SOLUTION INTRAVENOUS at 15:38

## 2024-04-26 RX ADMIN — ENOXAPARIN SODIUM 40 MG: 100 INJECTION SUBCUTANEOUS at 08:56

## 2024-04-26 RX ADMIN — LEVETIRACETAM 250 MG: 500 TABLET, FILM COATED ORAL at 08:56

## 2024-04-26 RX ADMIN — VERAPAMIL HYDROCHLORIDE 240 MG: 240 TABLET, FILM COATED, EXTENDED RELEASE ORAL at 08:57

## 2024-04-26 RX ADMIN — METOPROLOL SUCCINATE 50 MG: 50 TABLET, EXTENDED RELEASE ORAL at 08:56

## 2024-04-26 RX ADMIN — Medication 400 MG: at 08:56

## 2024-04-26 RX ADMIN — CALCITRIOL CAPSULES 0.25 MCG 0.5 MCG: 0.25 CAPSULE ORAL at 19:55

## 2024-04-26 RX ADMIN — SODIUM CHLORIDE: 9 INJECTION, SOLUTION INTRAVENOUS at 03:25

## 2024-04-26 RX ADMIN — CALCITRIOL CAPSULES 0.25 MCG 0.25 MCG: 0.25 CAPSULE ORAL at 08:56

## 2024-04-26 ASSESSMENT — PAIN SCALES - GENERAL
PAINLEVEL_OUTOF10: 0

## 2024-04-26 NOTE — CONSULTS
Urology Attending Consult Note      Reason for Consultation: Bilateral nephrolithiasis     History: 49 yo F with history of CVA with expressive aphasia who presented to the ER last night with abdominal pain. She answers to yes/no questions. Has not been seen in our office previously. CT scan showed a horseshoe kidney with bilateral nephrolithiasis and moderate L hydro. UA showed positive nitrites/leuks, culture pending. WBC 11, Cr 1.6. Afebrile overnight. She was admitted for further management.     Family History, Social History, Review of Systems:  Reviewed and agreed to as per chart    Vitals:  /75   Pulse 67   Temp 97.4 °F (36.3 °C) (Oral)   Resp 16   Ht 1.626 m (5' 4\")   Wt 79.4 kg (175 lb)   SpO2 96%   BMI 30.04 kg/m²   Temp  Av.9 °F (36.6 °C)  Min: 97.4 °F (36.3 °C)  Max: 98.2 °F (36.8 °C)  No intake or output data in the 24 hours ending 24 0937      Physical:  Well developed, well nourished in no acute distress  Mood indicates no abnormalities. Pt doesn’t appear depressed  Orientated to time and place  Neck is supple, trachea is midline  Respiratory effort is normal      Labs:  WBC:    Lab Results   Component Value Date/Time    WBC 11.6 2024 11:04 PM     Hemoglobin/Hematocrit:    Lab Results   Component Value Date/Time    HGB 13.5 2024 11:04 PM    HCT 40.6 2024 11:04 PM     BMP:    Lab Results   Component Value Date/Time     2024 11:04 PM    K 4.1 2024 11:04 PM     2024 11:04 PM    CO2 26 2024 11:04 PM    BUN 35 2024 11:04 PM    CREATININE 1.6 2024 11:04 PM    CALCIUM 8.6 2024 11:04 PM    GFRAA 53 2022 01:31 PM    GFRAA >60 2012 11:40 AM    LABGLOM 39 2024 11:04 PM     PT/INR:    Lab Results   Component Value Date/Time    PROTIME 11.8 2014 10:27 AM    INR 1.06 2014 10:27 AM     PTT:    Lab Results   Component Value Date/Time    APTT 34.9 2013 09:55 AM   [APT    Urine

## 2024-04-26 NOTE — ED NOTES
Pt placed on hospital bed with fresh blankets/pillows provided for comfort.     Filerman, Brandon, RN  04/26/24 0204

## 2024-04-26 NOTE — ED NOTES
Report called to [facility] all questions/concerns addressed states ready for pt.     Filerman, Brandon, RN  04/26/24 0511

## 2024-04-26 NOTE — PROGRESS NOTES
48-year-old female with past medical history of CVA, expressive aphasia admitted overnight with pyelonephritis.  She was seen and examined by the nocturnist and in formal H&P has been noted on this date.    On my exam she is sitting comfortably in chair eating her lunch denies any pain.  Seen by urology; CT scan showed horseshoe kidney with bilateral nephrolithiasis hydro on the left urine culture pending.  Further recommendation based on cultures continue with ABX and fluids; urology is considering possible stent placement.    ANNA MARIE Adams - CNP  4/26/2024  2:46 PM

## 2024-04-26 NOTE — PROGRESS NOTES
Patient admitted to room 3508. Report received from RN in ED via phone. VSS on room air. Patient oriented to room and call light. 4 eyes skin assessment completed with 2nd RN. All fall precautions in place.

## 2024-04-26 NOTE — ED NOTES
Pt assisted with family to restroom to provide urine sample, returned to bed.     Filerman, Brandon, RN  04/26/24 0026

## 2024-04-26 NOTE — ED NOTES
Pt resting comfortably eyes closed in hospital bed locked lowest position, rails up x3, NAD, chest rise and fall noted, on BP cuff Pulse ox and Cardiac monitor, NS infusing at 75ml/hr to IV without complication.     Filerman, Brandon, RN  04/26/24 6515

## 2024-04-26 NOTE — H&P
with bilateral nephrolithiasis and moderate hydronephrosis.    Assessment/Plan:      Current Principal Problem:  Pyelonephritis    1.  Left flank pain was UTI and signs of pyelonephritis patient was started on Rocephin which will adjust to 2 g IV daily and monitor culture result.  2.  Development horseshoe kidney with bilateral nephrolithiasis and moderate hydronephrosis in view of the urine infection we will proceed with a consult for urology for evaluation in case patient will require stent placement especially that she had mild elevation in her creatinine.  3.  Chronic kidney disease stage III with mild elevation in creatinine from baseline could be related to dehydration placed on IV fluid normal saline at 75 cc/h.  4.  Seizure disorder continue with Keppra 250 mg oral daily.  5.  Incidental finding of left thyroid lesion which is large may benefit from elective ultrasound of the thyroid, however  had a question if it may be related to her hyperparathyroidism.  6.  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  7.  Hypertension with blood pressure was 144/93 improved later continue with her Toprol-XL.    Discussed management and the need for Hospitalization of the patient w/ the Emergency Department Provider: Jonathan Kingsley MD       Physical Exam Performed:      BP (!) 144/93   Pulse 76   Temp 98.2 °F (36.8 °C) (Oral)   Resp 17   Ht 1.626 m (5' 4\")   Wt 79.4 kg (175 lb)   SpO2 93%   BMI 30.04 kg/m²     General appearance:  No apparent distress, appears stated age and cooperative.  HEENT:  Pupils equal, round, and reactive to light.  Dry oral mucosal membrane.  Respiratory:  Normal respiratory effort. Clear to auscultation, bilaterally without Rales/Wheezes/Rhonchi.  Cardiovascular:  Regular rate and rhythm with normal S1/S2.  Abdomen:  Soft, left-sided tenderness, non-distended with normal bowel

## 2024-04-26 NOTE — PROGRESS NOTES
VSS, afebrile, pt currently denies having pain.    Pt a/o to self, nods/gestures appropriately, follows all commands. Has expressive aphasia from previous stroke as well as RSW.    POC discussed with pt, fall px in place.

## 2024-04-26 NOTE — ED PROVIDER NOTES
ED Attending Attestation Note     Date of evaluation: 4/25/2024    This patient was seen by the resident.  I have seen and examined the patient, agree with the workup, evaluation, management and diagnosis. The care plan has been discussed.  I have reviewed the ECG and concur with the resident's interpretation.  My assessment reveals patient with history of expressive aphasia secondary to prior CVA with  shunt in place presents for left flank pain.  Patient indicates pain in the left upper quadrant of the abdomen and left flank that she states has been there starting today.  On arrival, patient is hemodynamically stable.  She has mild tenderness to the left upper quadrant of the abdomen.  Will check laboratory studies, imaging.     Yair Vasquez MD  04/26/24 0144    
COVID-19 & Influenza Combo:    SARS-CoV-2 RNA, RT PCR NOT DETECTED   INFLUENZA A NOT DETECTED   INFLUENZA B NOT DETECTED [JF]   2352 XR SHUNT SERIES PLACEMENT (<4 VIEWS)    IMPRESSION:     The ventricular peritoneal shunt is intact throughout   [JF]   Fri Apr 26, 2024   0051 Urinalysis with Reflex to Culture(!):    Color, UA Yellow   Clarity, UA Clear   Glucose, UA Negative   Bilirubin, Urine Negative   Ketones, Urine Negative   Specific Gravity, UA 1.020   Blood, Urine Negative   pH, UA 6.0   Protein, UA Negative   Urobilinogen, Urine 0.2   Nitrite, Urine POSITIVE(!)   Leukocyte Esterase, Urine LARGE(!)   Microscopic Examination YES   Urine Type Voided  Urinalysis positive for leukocytes and nitrites.  Suspect likely urinary tract infection.  Microscopic analysis is pending.  Will send urine culture. [JF]   0113 CT ABDOMEN PELVIS W IV CONTRAST Additional Contrast? None  IMPRESSION:     Developmental horseshoe kidney with bilateral nephrolithiasis. The left renal collecting system is more prominent, moderate hydronephrosis, than it was November 27, 2009. I cannot exclude some element of stenosis of the left ureteropelvic junction   [JF]   0114 WBC, UA(!): 21-50 [JF]   0118 CT CHEST PULMONARY EMBOLISM W CONTRAST  IMPRESSION:  1. No evidence for pulmonary embolism  2. Large left thyroid lesion, would recommend further evaluation with elective thyroid ultrasound to better characterize  3. Paraseptal emphysematous changes both upper lungs  4. 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  On arrival, the patient is well-appearing and in no acute distress.  She is hemodynamically stable and afebrile.  However, the patient is noted to have intermittent hypoxia though does not require oxygen at this time.  On physical examination, abdomen is soft, nontender,

## 2024-04-26 NOTE — PROGRESS NOTES
4 Eyes Skin Assessment     NAME:  Tete Camara  YOB: 1975  MEDICAL RECORD NUMBER:  [id number]    The patient is being assessed for  Admission    I agree that at least one RN has performed a thorough Head to Toe Skin Assessment on the patient. ALL assessment sites listed below have been assessed.      Areas assessed by both nurses:    Head, Face, Ears, Shoulders, Back, Chest, Arms, Elbows, Hands, Sacrum. Buttock, Coccyx, Ischium, Legs. Feet and Heels, and Under Medical Devices         Does the Patient have a Wound? No noted wound(s)       Ryan Prevention initiated by RN: No  Wound Care Orders initiated by RN: No    Pressure Injury (Stage 3,4, Unstageable, DTI, NWPT, and Complex wounds) if present, place Wound referral order by RN under : No    New Ostomies, if present place, Ostomy referral order under : No     Nurse 1 eSignature: Electronically signed by Mychal Love RN on 4/26/24 at 5:28 AM EDT    **SHARE this note so that the co-signing nurse can place an eSignature**    Nurse 2 eSignature: Electronically signed by Anamaria Kinsey RN on 4/26/24 at 5:29 AM EDT

## 2024-04-26 NOTE — CARE COORDINATION
discharge: N/A            Potential DME:    Patient expects to discharge to: House  Plan for transportation at discharge:      Financial    Payor: MEDICARE / Plan: MEDICARE PART A AND B / Product Type: *No Product type* /     Does insurance require precert for SNF: No    Potential assistance Purchasing Medications: No  Meds-to-Beds request:        [facility] - [place], [place] - [address] - P [phone] - F [phone]  [address] [address]  Phone: [phone] Fax: [phone]    [facility] - [place], [place] - [address] [facility] - P [phone] - F [phone]  [address]  [address]  [facility] [address]  Phone: [phone] Fax: [phone]    [facility] ([facility] - [place], [place] - [address] [phone] - F [phone]  [address]  [facility] [address]  Phone: [phone] Fax: [phone]      Notes:    Factors facilitating achievement of predicted outcomes: Family support, Motivated, Cooperative, and Pleasant    Barriers to discharge: Medical complications and expressive aphasia    Additional Case Management Notes: Chart review completed. Spoke with pt's RN when she was leaving pt's room who states pt is able to answer yes/no questions due to expressive aphasia from past stroke.    Spoke with pt at bedside. Asked yes/no questions and pt able to answer questions being asked. Pt stated she is independent and has no services. She stated her  when bring her home when able.     SW will follow    The Plan for Transition of Care is related to the following treatment goals of Pyelonephritis [N12]    BERTHA Yates-S   for [facility] [place] ([facility])  [place] [facility]: [phone]

## 2024-04-26 NOTE — ED NOTES
Pt back from radiology. Pt laying in bed eyes open, NAD, resp e/u on RA, bed locked lowest position, rails up x2, call light within reach, denies needs or c/o at this time, watching tv.     Filerman, Brandon, RN  04/25/24 1149

## 2024-04-26 NOTE — PROGRESS NOTES
Pt unable to participate in home medication portion of admission documentation d/t aphasia. Pt does best with Y/N questions. Oncoming nurse will need to contact  to complete home medications.

## 2024-04-27 LAB
ANION GAP SERPL CALCULATED.3IONS-SCNC: 10 MMOL/L (ref 3–16)
BASOPHILS # BLD: 0.1 K/UL (ref 0–0.2)
BASOPHILS NFR BLD: 0.7 %
BUN SERPL-MCNC: 22 MG/DL (ref 7–20)
CALCIUM SERPL-MCNC: 6.9 MG/DL (ref 8.3–10.6)
CHLORIDE SERPL-SCNC: 105 MMOL/L (ref 99–110)
CO2 SERPL-SCNC: 26 MMOL/L (ref 21–32)
CREAT SERPL-MCNC: 1.3 MG/DL (ref 0.6–1.1)
DEPRECATED RDW RBC AUTO: 13.4 % (ref 12.4–15.4)
EOSINOPHIL # BLD: 0.2 K/UL (ref 0–0.6)
EOSINOPHIL NFR BLD: 2.7 %
GFR SERPLBLD CREATININE-BSD FMLA CKD-EPI: 50 ML/MIN/{1.73_M2}
GLUCOSE SERPL-MCNC: 95 MG/DL (ref 70–99)
HCT VFR BLD AUTO: 37.2 % (ref 36–48)
HGB BLD-MCNC: 12.4 G/DL (ref 12–16)
LYMPHOCYTES # BLD: 2.2 K/UL (ref 1–5.1)
LYMPHOCYTES NFR BLD: 32 %
MCH RBC QN AUTO: 28.8 PG (ref 26–34)
MCHC RBC AUTO-ENTMCNC: 33.3 G/DL (ref 31–36)
MCV RBC AUTO: 86.4 FL (ref 80–100)
MONOCYTES # BLD: 0.6 K/UL (ref 0–1.3)
MONOCYTES NFR BLD: 8.4 %
NEUTROPHILS # BLD: 3.9 K/UL (ref 1.7–7.7)
NEUTROPHILS NFR BLD: 56.2 %
PLATELET # BLD AUTO: 179 K/UL (ref 135–450)
PMV BLD AUTO: 10.1 FL (ref 5–10.5)
POTASSIUM SERPL-SCNC: 3.6 MMOL/L (ref 3.5–5.1)
RBC # BLD AUTO: 4.3 M/UL (ref 4–5.2)
SODIUM SERPL-SCNC: 141 MMOL/L (ref 136–145)
WBC # BLD AUTO: 7 K/UL (ref 4–11)

## 2024-04-27 PROCEDURE — 85025 COMPLETE CBC W/AUTO DIFF WBC: CPT

## 2024-04-27 PROCEDURE — 6370000000 HC RX 637 (ALT 250 FOR IP): Performed by: INTERNAL MEDICINE

## 2024-04-27 PROCEDURE — 6360000002 HC RX W HCPCS: Performed by: INTERNAL MEDICINE

## 2024-04-27 PROCEDURE — 80048 BASIC METABOLIC PNL TOTAL CA: CPT

## 2024-04-27 PROCEDURE — 2060000000 HC ICU INTERMEDIATE R&B

## 2024-04-27 PROCEDURE — 2580000003 HC RX 258: Performed by: INTERNAL MEDICINE

## 2024-04-27 PROCEDURE — 36415 COLL VENOUS BLD VENIPUNCTURE: CPT

## 2024-04-27 RX ORDER — TAMSULOSIN HYDROCHLORIDE 0.4 MG/1
0.4 CAPSULE ORAL DAILY
Qty: 30 CAPSULE | Refills: 0 | Status: SHIPPED | OUTPATIENT
Start: 2024-04-27

## 2024-04-27 RX ADMIN — VERAPAMIL HYDROCHLORIDE 240 MG: 240 TABLET, FILM COATED, EXTENDED RELEASE ORAL at 09:00

## 2024-04-27 RX ADMIN — Medication 400 MG: at 09:01

## 2024-04-27 RX ADMIN — ENOXAPARIN SODIUM 40 MG: 100 INJECTION SUBCUTANEOUS at 09:03

## 2024-04-27 RX ADMIN — SODIUM CHLORIDE, PRESERVATIVE FREE 10 ML: 5 INJECTION INTRAVENOUS at 19:50

## 2024-04-27 RX ADMIN — WATER 2000 MG: 1 INJECTION INTRAMUSCULAR; INTRAVENOUS; SUBCUTANEOUS at 02:00

## 2024-04-27 RX ADMIN — CALCITRIOL CAPSULES 0.25 MCG 0.5 MCG: 0.25 CAPSULE ORAL at 19:50

## 2024-04-27 RX ADMIN — LEVETIRACETAM 250 MG: 500 TABLET, FILM COATED ORAL at 09:01

## 2024-04-27 RX ADMIN — CALCITRIOL CAPSULES 0.25 MCG 0.25 MCG: 0.25 CAPSULE ORAL at 09:01

## 2024-04-27 RX ADMIN — METOPROLOL SUCCINATE 50 MG: 50 TABLET, EXTENDED RELEASE ORAL at 09:00

## 2024-04-27 RX ADMIN — SODIUM CHLORIDE: 9 INJECTION, SOLUTION INTRAVENOUS at 04:07

## 2024-04-27 NOTE — PROGRESS NOTES
V2.0    [facility] Progress Note      Name:  Tete Camara /Age/Sex: 1975  (48 y.o. female)   MRN & CSN:  [id number] & [id number] Encounter Date/Time: 2024 1:54 PM EDT   Location:  [facility]/3508-01 PCP: Rich Morrison MD     Attending:Eva Polanco MD       Hospital Day: 3    Assessment and Recommendations   Tete Camara is a 48 y.o. female with pmh of CVA, expressive aphasia admitted overnight with pyelonephritis.  She was seen and examined by the nocturnist and in formal H&P has been noted on this date.     On my exam she is sitting comfortably in chair eating her lunch denies any pain.  Seen by urology; CT scan showed horseshoe kidney with bilateral nephrolithiasis hydro on the left urine culture pending.  Further recommendation based on cultures continue with ABX and fluids; urology is considering possible stent placement. who presents with Pyelonephritis      Plan:   1.  Left flank pain was UTI and signs of pyelonephritis patient was started on Rocephin which will adjust to 2 g IV daily and monitor culture result.  Urine culture currently growing E. coli awaiting sensitivity to plan further.  Discussed with urology team they are okay with discharging patient on oral antibiotics and outpatient follow-up once cleared sensitivity  2.  Development horseshoe kidney with bilateral nephrolithiasis and moderate hydronephrosis in view of the urine infection we will proceed with a consult for urology for evaluation in case patient will require stent placement especially that she had mild elevation in her creatinine.  3.  Chronic kidney disease stage III with mild elevation in creatinine from baseline could be related to dehydration placed on IV fluid normal saline at 75 cc/h.  4.  Seizure disorder continue with Keppra 250 mg oral daily.  5.  Incidental finding of left thyroid lesion which is large may benefit from elective ultrasound of the thyroid, however  had a question if it may be related to

## 2024-04-27 NOTE — PLAN OF CARE
Problem: Pain  Goal: Verbalizes/displays adequate comfort level or baseline comfort level  4/27/2024 1508 by Shantel Hernandez, RN  Outcome: Progressing  Note: Pt states no pain     Problem: Safety - Adult  Goal: Free from fall injury  4/27/2024 1508 by Shantel Hernandez, RN  Outcome: Progressing  Flowsheets (Taken 4/27/2024 1508)  Free From Fall Injury: Instruct family/caregiver on patient safety  Note: Pt in bed with bed alarm on, instructed to call for assistance. Verbalized understanding. All fall precautions in place.

## 2024-04-27 NOTE — PROGRESS NOTES
Urology Attending Progress Note    Subjective: Appears to be feeling well today. Seen up in bed,  present in room.    Vitals:  /82   Pulse 70   Temp 98.4 °F (36.9 °C) (Axillary)   Resp 16   Ht 1.626 m (5' 4\")   Wt 73.1 kg (161 lb 3.2 oz)   SpO2 96%   BMI 27.67 kg/m²   Temp  Av.9 °F (36.6 °C)  Min: 96.5 °F (35.8 °C)  Max: 99.3 °F (37.4 °C)    Intake/Output Summary (Last 24 hours) at 2024 1341  Last data filed at 2024 1211  Gross per 24 hour   Intake 3387.53 ml   Output 1700 ml   Net 1687.53 ml       Exam: Comfortable    Labs:  WBC:    Lab Results   Component Value Date/Time    WBC 7.0 2024 04:56 AM     Hemoglobin/Hematocrit:    Lab Results   Component Value Date/Time    HGB 12.4 2024 04:56 AM    HCT 37.2 2024 04:56 AM     BMP:    Lab Results   Component Value Date/Time     2024 04:56 AM    K 3.6 2024 04:56 AM     2024 04:56 AM    CO2 26 2024 04:56 AM    BUN 22 2024 04:56 AM    CREATININE 1.3 2024 04:56 AM    CALCIUM 6.9 2024 04:56 AM    GFRAA 53 2022 01:31 PM    GFRAA >60 2012 11:40 AM    LABGLOM 50 2024 04:56 AM     PT/INR:    Lab Results   Component Value Date/Time    PROTIME 11.8 2014 10:27 AM    INR 1.06 2014 10:27 AM     PTT:    Lab Results   Component Value Date/Time    APTT 34.9 2013 09:55 AM   [APTT    Urine Culture:  E.coli    Antibiotic Therapy:  Rocephin    Imaging: IMPRESSION:     Developmental horseshoe kidney with bilateral nephrolithiasis. The left renal collecting system is more prominent, moderate hydronephrosis, than it was 2009. I cannot exclude some element of stenosis of the left ureteropelvic junction      Impression/Plan: 47 yo F with expressive aphasia from prior CVA admitted with pyelonephritis.     -Doing better today.  present in room  -Culture showing E.coli.   -Cr/WBC improved. Afebrile overnight  -Discussed with medicine,

## 2024-04-28 VITALS
SYSTOLIC BLOOD PRESSURE: 131 MMHG | HEIGHT: 64 IN | HEART RATE: 60 BPM | RESPIRATION RATE: 16 BRPM | DIASTOLIC BLOOD PRESSURE: 77 MMHG | TEMPERATURE: 98.7 F | BODY MASS INDEX: 27.52 KG/M2 | OXYGEN SATURATION: 93 % | WEIGHT: 161.2 LBS

## 2024-04-28 LAB
BACTERIA UR CULT: ABNORMAL
ORGANISM: ABNORMAL

## 2024-04-28 PROCEDURE — 6370000000 HC RX 637 (ALT 250 FOR IP): Performed by: INTERNAL MEDICINE

## 2024-04-28 PROCEDURE — 2580000003 HC RX 258: Performed by: INTERNAL MEDICINE

## 2024-04-28 PROCEDURE — 6360000002 HC RX W HCPCS: Performed by: INTERNAL MEDICINE

## 2024-04-28 RX ORDER — CEFUROXIME AXETIL 500 MG/1
500 TABLET ORAL 2 TIMES DAILY
Qty: 20 TABLET | Refills: 0 | Status: SHIPPED | OUTPATIENT
Start: 2024-04-28 | End: 2024-05-08

## 2024-04-28 RX ORDER — CEFUROXIME AXETIL 500 MG/1
500 TABLET ORAL 2 TIMES DAILY
Qty: 20 TABLET | Refills: 0 | Status: SHIPPED | OUTPATIENT
Start: 2024-04-28 | End: 2024-04-28

## 2024-04-28 RX ADMIN — ENOXAPARIN SODIUM 40 MG: 100 INJECTION SUBCUTANEOUS at 09:28

## 2024-04-28 RX ADMIN — SODIUM CHLORIDE, PRESERVATIVE FREE 10 ML: 5 INJECTION INTRAVENOUS at 09:29

## 2024-04-28 RX ADMIN — CALCITRIOL CAPSULES 0.25 MCG 0.25 MCG: 0.25 CAPSULE ORAL at 09:25

## 2024-04-28 RX ADMIN — VERAPAMIL HYDROCHLORIDE 240 MG: 240 TABLET, FILM COATED, EXTENDED RELEASE ORAL at 09:25

## 2024-04-28 RX ADMIN — METOPROLOL SUCCINATE 50 MG: 50 TABLET, EXTENDED RELEASE ORAL at 09:25

## 2024-04-28 RX ADMIN — LEVETIRACETAM 250 MG: 500 TABLET, FILM COATED ORAL at 09:25

## 2024-04-28 RX ADMIN — WATER 2000 MG: 1 INJECTION INTRAMUSCULAR; INTRAVENOUS; SUBCUTANEOUS at 00:59

## 2024-04-28 RX ADMIN — Medication 400 MG: at 09:25

## 2024-04-28 NOTE — PLAN OF CARE
Problem: Pain  Goal: Verbalizes/displays adequate comfort level or baseline comfort level  4/28/2024 1130 by Michelle Alejandra, RN  Outcome: Completed     Problem: Safety - Adult  Goal: Free from fall injury  4/28/2024 1130 by Michelle Alejandra, RN  Outcome: Completed     Problem: ABCDS Injury Assessment  Goal: Absence of physical injury  4/28/2024 1130 by Michelle Alejandra, RN  Outcome: Completed     Problem: Chronic Conditions and Co-morbidities  Goal: Patient's chronic conditions and co-morbidity symptoms are monitored and maintained or improved  4/28/2024 1130 by Michelle Alejandra, RN  Outcome: Completed

## 2024-04-28 NOTE — DISCHARGE SUMMARY
The. FINDINGS: No filling defects identified within the left or right pulmonary flow tracts. The ascending thoracic aorta measures 3.4 cm. Anterior aortic arch measures 3 cm. Within left thyroid gland, evidence of a 2.9 x 4.2 cm rounded lesion. Paraseptal emphysematous changes both lung apices.     1. No evidence for pulmonary embolism 2. Large left thyroid lesion, would recommend further evaluation with elective thyroid ultrasound to better characterize 3. Paraseptal emphysematous changes both upper lungs 4. 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 None    Result Date: 4/26/2024  Reason: Left upper quadrant pain CT the abdomen pelvis with contrast TECHNIQUE: Collimated helical images are made from the hemidiaphragm through the pubic symphysis after her milliliters of Isovue-370 intravenous contrast. Up-to-date CT equipment and radiation dose reduction techniques were employed. FINDINGS: A developmental horseshoe kidneys identified. Calcifications identified in bilateral renal collecting systems, largest on the left measures 12 mm. The liver, pancreas, spleen unremarkable. Adrenals unremarkable. Findings CT the pelvis: No free fluid.     Developmental horseshoe kidney with bilateral nephrolithiasis. The left renal collecting system is more prominent, moderate hydronephrosis, than it was November 27, 2009. I cannot exclude some element of stenosis of the left ureteropelvic junction Electronically signed by MD Portillo Rader    XR SHUNT SERIES PLACEMENT (<4 VIEWS)    Result Date: 4/25/2024  Reason: Status post shunt placement X-ray shunt series placement 8 views FINDINGS: A left-sided ventriculoperitoneal shunt has been placed. The shunt tubing along the left neck, left chest, left abdomen are intact. The intra-abdominal portion goes left or right into the pelvis.     The ventricular peritoneal shunt is

## 2024-04-28 NOTE — PLAN OF CARE
Problem: Pain  Goal: Verbalizes/displays adequate comfort level or baseline comfort level  4/28/2024 0119 by Bridget Quinones, RN  Outcome: Progressing  Note:   Pt not complaining of any pain this PM. Pt instructed to call for new/increasing pain levels. Pt verbalized understanding. Will continue to monitor.        Problem: Safety - Adult  Goal: Free from fall injury  4/28/2024 0119 by Bridget Quinones, RN  Outcome: Progressing  Note: Pt in bed with bed alarm on, instructed to call for assistance. Verbalized understanding. All fall precautions in place.      Problem: Chronic Conditions and Co-morbidities  Goal: Patient's chronic conditions and co-morbidity symptoms are monitored and maintained or improved  Outcome: Progressing

## 2024-04-29 ENCOUNTER — TELEPHONE (OUTPATIENT)
Dept: FAMILY MEDICINE CLINIC | Age: 49
End: 2024-04-29

## 2024-04-29 NOTE — TELEPHONE ENCOUNTER
Care Transitions Initial Follow Up Call    Outreach made within 2 business days of discharge: Yes    Patient: Tete Camara Patient : 1975   MRN: [id number]  Reason for Admission: There are no discharge diagnoses documented for the most recent discharge.  Discharge Date: 24       Spoke with: Left message on recorder for patient to call back at  [phone]      Discharge department/facility:     [facility] Interactive Patient Contact:  Was patient able to fill all prescriptions:   Was patient instructed to bring all medications to the follow-up visit:   Is patient taking all medications as directed in the discharge summary?   Does patient understand their discharge instructions:   Does patient have questions or concerns that need addressed prior to 7-14 day follow up office visit:     Scheduled appointment with PCP within 7-14 days    Follow Up  Future Appointments   Date Time Provider Department Center   2024  2:30 PM Rich Morrison MD [facility] [place] [facility] Mindy Schmidt, MA

## 2024-05-05 RX ORDER — LEVETIRACETAM 250 MG/1
250 TABLET ORAL DAILY
Qty: 90 TABLET | Refills: 3 | Status: SHIPPED | OUTPATIENT
Start: 2024-05-05

## 2024-05-06 ENCOUNTER — OFFICE VISIT (OUTPATIENT)
Dept: FAMILY MEDICINE CLINIC | Age: 49
End: 2024-05-06

## 2024-05-06 VITALS
SYSTOLIC BLOOD PRESSURE: 126 MMHG | OXYGEN SATURATION: 98 % | DIASTOLIC BLOOD PRESSURE: 86 MMHG | WEIGHT: 173.6 LBS | TEMPERATURE: 96.9 F | BODY MASS INDEX: 29.8 KG/M2 | HEART RATE: 72 BPM

## 2024-05-06 DIAGNOSIS — E04.1 THYROID NODULE: ICD-10-CM

## 2024-05-06 DIAGNOSIS — Q62.39 UPJ OBSTRUCTION, CONGENITAL: ICD-10-CM

## 2024-05-06 DIAGNOSIS — N13.30 HYDRONEPHROSIS, UNSPECIFIED HYDRONEPHROSIS TYPE: ICD-10-CM

## 2024-05-06 DIAGNOSIS — N12 PYELONEPHRITIS: ICD-10-CM

## 2024-05-06 DIAGNOSIS — G91.1 OBSTRUCTIVE HYDROCEPHALUS (HCC): ICD-10-CM

## 2024-05-06 DIAGNOSIS — Z72.0 TOBACCO USE: ICD-10-CM

## 2024-05-06 DIAGNOSIS — R73.9 HYPERGLYCEMIA: ICD-10-CM

## 2024-05-06 DIAGNOSIS — Z86.73 HISTORY OF STROKE: ICD-10-CM

## 2024-05-06 DIAGNOSIS — E20.9 HYPOPARATHYROIDISM, UNSPECIFIED HYPOPARATHYROIDISM TYPE (HCC): ICD-10-CM

## 2024-05-06 DIAGNOSIS — N20.0 NEPHROLITHIASIS: ICD-10-CM

## 2024-05-06 DIAGNOSIS — N18.31 CHRONIC RENAL IMPAIRMENT, STAGE 3A (HCC): ICD-10-CM

## 2024-05-06 DIAGNOSIS — E83.51 HYPOCALCEMIA: ICD-10-CM

## 2024-05-06 DIAGNOSIS — I10 ESSENTIAL HYPERTENSION, BENIGN: Primary | ICD-10-CM

## 2024-05-06 DIAGNOSIS — I77.810 ASCENDING AORTA DILATION (HCC): ICD-10-CM

## 2024-05-06 PROBLEM — N18.9 CRI (CHRONIC RENAL INSUFFICIENCY): Status: ACTIVE | Noted: 2024-05-06

## 2024-05-06 ASSESSMENT — PATIENT HEALTH QUESTIONNAIRE - PHQ9
SUM OF ALL RESPONSES TO PHQ QUESTIONS 1-9: 0
SUM OF ALL RESPONSES TO PHQ9 QUESTIONS 1 & 2: 0
SUM OF ALL RESPONSES TO PHQ QUESTIONS 1-9: 0
2. FEELING DOWN, DEPRESSED OR HOPELESS: NOT AT ALL
1. LITTLE INTEREST OR PLEASURE IN DOING THINGS: NOT AT ALL
SUM OF ALL RESPONSES TO PHQ QUESTIONS 1-9: 0
SUM OF ALL RESPONSES TO PHQ QUESTIONS 1-9: 0

## 2024-05-07 DIAGNOSIS — N12 PYELONEPHRITIS: ICD-10-CM

## 2024-05-07 LAB
ANION GAP SERPL CALCULATED.3IONS-SCNC: 13 MMOL/L (ref 3–16)
BUN SERPL-MCNC: 26 MG/DL (ref 7–20)
CALCIUM SERPL-MCNC: 10.4 MG/DL (ref 8.3–10.6)
CHLORIDE SERPL-SCNC: 101 MMOL/L (ref 99–110)
CO2 SERPL-SCNC: 29 MMOL/L (ref 21–32)
CREAT SERPL-MCNC: 1.5 MG/DL (ref 0.6–1.1)
EST. AVERAGE GLUCOSE BLD GHB EST-MCNC: 119.8 MG/DL
GFR SERPLBLD CREATININE-BSD FMLA CKD-EPI: 42 ML/MIN/{1.73_M2}
GLUCOSE SERPL-MCNC: 90 MG/DL (ref 70–99)
HBA1C MFR BLD: 5.8 %
POTASSIUM SERPL-SCNC: 4.3 MMOL/L (ref 3.5–5.1)
PTH-INTACT SERPL-MCNC: 5.1 PG/ML (ref 14–72)
SODIUM SERPL-SCNC: 143 MMOL/L (ref 136–145)
T4 FREE SERPL-MCNC: 1.4 NG/DL (ref 0.9–1.8)
TSH SERPL DL<=0.005 MIU/L-ACNC: 1.68 UIU/ML (ref 0.27–4.2)

## 2024-05-08 LAB
BACTERIA URNS QL MICRO: ABNORMAL /HPF
BILIRUB UR QL STRIP.AUTO: NEGATIVE
CASTS #/AREA URNS LPF: ABNORMAL /LPF
CLARITY UR: CLEAR
COLOR UR: YELLOW
EPI CELLS #/AREA URNS AUTO: 1 /HPF (ref 0–5)
GLUCOSE UR STRIP.AUTO-MCNC: NEGATIVE MG/DL
HGB UR QL STRIP.AUTO: NEGATIVE
HYALINE CASTS #/AREA URNS AUTO: 0 /LPF (ref 0–8)
KETONES UR STRIP.AUTO-MCNC: NEGATIVE MG/DL
LEUKOCYTE ESTERASE UR QL STRIP.AUTO: ABNORMAL
NITRITE UR QL STRIP.AUTO: NEGATIVE
PH UR STRIP.AUTO: 6.5 [PH] (ref 5–8)
PROT UR STRIP.AUTO-MCNC: NEGATIVE MG/DL
RBC CLUMPS #/AREA URNS AUTO: 0 /HPF (ref 0–4)
SP GR UR STRIP.AUTO: 1.01 (ref 1–1.03)
UA DIPSTICK W REFLEX MICRO PNL UR: YES
URN SPEC COLLECT METH UR: ABNORMAL
UROBILINOGEN UR STRIP-ACNC: 0.2 E.U./DL
WBC #/AREA URNS HPF: ABNORMAL /HPF (ref 0–5)

## 2024-05-09 ENCOUNTER — HOSPITAL ENCOUNTER (OUTPATIENT)
Dept: ULTRASOUND IMAGING | Age: 49
Discharge: HOME | End: 2024-05-09
Payer: MEDICARE

## 2024-05-09 ENCOUNTER — HOSPITAL ENCOUNTER (OUTPATIENT)
Dept: NUCLEAR MEDICINE | Age: 49
Discharge: HOME | End: 2024-05-09
Payer: MEDICARE

## 2024-05-09 DIAGNOSIS — N13.30 HYDRONEPHROSIS, UNSPECIFIED HYDRONEPHROSIS TYPE: ICD-10-CM

## 2024-05-09 DIAGNOSIS — Q63.1 LOBULATED, FUSED AND HORSESHOE KIDNEY: ICD-10-CM

## 2024-05-09 DIAGNOSIS — E04.1 THYROID NODULE: ICD-10-CM

## 2024-05-09 LAB — BACTERIA UR CULT: NORMAL

## 2024-05-09 PROCEDURE — 78707 K FLOW/FUNCT IMAGE W/O DRUG: CPT

## 2024-05-09 PROCEDURE — 76536 US EXAM OF HEAD AND NECK: CPT

## 2024-05-09 PROCEDURE — A9562 TC99M MERTIATIDE: HCPCS | Performed by: STUDENT IN AN ORGANIZED HEALTH CARE EDUCATION/TRAINING PROGRAM

## 2024-05-09 PROCEDURE — 3430000000 HC RX DIAGNOSTIC RADIOPHARMACEUTICAL: Performed by: STUDENT IN AN ORGANIZED HEALTH CARE EDUCATION/TRAINING PROGRAM

## 2024-05-09 RX ORDER — FUROSEMIDE 10 MG/ML
20 INJECTION INTRAMUSCULAR; INTRAVENOUS ONCE
Status: DISCONTINUED | OUTPATIENT
Start: 2024-05-09 | End: 2024-05-09

## 2024-05-09 RX ORDER — FUROSEMIDE 10 MG/ML
20 INJECTION INTRAMUSCULAR; INTRAVENOUS ONCE
Status: DISCONTINUED | OUTPATIENT
Start: 2024-05-09 | End: 2024-05-10 | Stop reason: HOSPADM

## 2024-05-09 RX ADMIN — TECHNESCAN TC 99M MERTIATIDE 8 MILLICURIE: 1 INJECTION, POWDER, LYOPHILIZED, FOR SOLUTION INTRAVENOUS at 13:43

## 2024-05-13 PROBLEM — E04.1 THYROID NODULE: Status: ACTIVE | Noted: 2024-05-13

## 2024-05-19 RX ORDER — VERAPAMIL HYDROCHLORIDE 300 MG/1
300 CAPSULE, EXTENDED RELEASE ORAL DAILY
Qty: 90 CAPSULE | Refills: 1 | OUTPATIENT
Start: 2024-05-19

## 2024-05-19 RX ORDER — VERAPAMIL HYDROCHLORIDE 300 MG/1
300 CAPSULE, EXTENDED RELEASE ORAL DAILY
Qty: 90 CAPSULE | Refills: 1 | Status: SHIPPED | OUTPATIENT
Start: 2024-05-19

## 2024-05-30 ENCOUNTER — TRANSCRIBE ORDERS (OUTPATIENT)
Dept: ADMINISTRATIVE | Age: 49
End: 2024-05-30

## 2024-05-30 DIAGNOSIS — Q63.1 HORSESHOE KIDNEY: Primary | ICD-10-CM

## 2024-05-30 DIAGNOSIS — N13.30 HYDRONEPHROSIS, UNSPECIFIED HYDRONEPHROSIS TYPE: ICD-10-CM

## 2024-06-03 ENCOUNTER — HOSPITAL ENCOUNTER (OUTPATIENT)
Dept: NUCLEAR MEDICINE | Age: 49
Discharge: HOME | End: 2024-06-03
Payer: MEDICARE

## 2024-06-03 DIAGNOSIS — Q63.1 HORSESHOE KIDNEY: ICD-10-CM

## 2024-06-03 PROCEDURE — 78708 K FLOW/FUNCT IMAGE W/DRUG: CPT

## 2024-06-03 RX ORDER — FUROSEMIDE 10 MG/ML
40 INJECTION INTRAMUSCULAR; INTRAVENOUS ONCE
Status: DISCONTINUED | OUTPATIENT
Start: 2024-06-03 | End: 2024-06-04 | Stop reason: HOSPADM

## 2024-08-02 ENCOUNTER — TELEPHONE (OUTPATIENT)
Dept: FAMILY MEDICINE CLINIC | Age: 49
End: 2024-08-02

## 2024-08-02 DIAGNOSIS — E83.42 HYPOMAGNESEMIA: ICD-10-CM

## 2024-08-02 DIAGNOSIS — E83.51 HYPOCALCEMIA: Primary | ICD-10-CM

## 2024-08-02 NOTE — TELEPHONE ENCOUNTER
Pt is having nausea again  Asking if she can get lab orders for calcium and magnesium  They used to have standing orders but she hasn't had problems in a while

## 2024-08-03 DIAGNOSIS — E83.42 HYPOMAGNESEMIA: ICD-10-CM

## 2024-08-03 DIAGNOSIS — E83.51 HYPOCALCEMIA: ICD-10-CM

## 2024-08-03 LAB
ALBUMIN SERPL-MCNC: 4.4 G/DL (ref 3.4–5)
ALP SERPL-CCNC: 73 U/L (ref 40–129)
ALT SERPL-CCNC: 8 U/L (ref 10–40)
ANION GAP SERPL CALCULATED.3IONS-SCNC: 17 MMOL/L (ref 3–16)
AST SERPL-CCNC: 9 U/L (ref 15–37)
BILIRUB SERPL-MCNC: 0.6 MG/DL (ref 0–1)
BUN SERPL-MCNC: 24 MG/DL (ref 7–20)
CALCIUM SERPL-MCNC: 8.5 MG/DL (ref 8.3–10.6)
CHLORIDE SERPL-SCNC: 98 MMOL/L (ref 99–110)
CO2 SERPL-SCNC: 29 MMOL/L (ref 21–32)
CREAT SERPL-MCNC: 1.9 MG/DL (ref 0.6–1.1)
GFR SERPLBLD CREATININE-BSD FMLA CKD-EPI: 32 ML/MIN/{1.73_M2}
GLUCOSE SERPL-MCNC: 95 MG/DL (ref 70–99)
MAGNESIUM SERPL-MCNC: 1.8 MG/DL (ref 1.8–2.4)
POTASSIUM SERPL-SCNC: 3.9 MMOL/L (ref 3.5–5.1)
PROT SERPL-MCNC: 7.5 G/DL (ref 6.4–8.2)
SODIUM SERPL-SCNC: 144 MMOL/L (ref 136–145)

## 2024-08-04 DIAGNOSIS — N18.31 CHRONIC RENAL IMPAIRMENT, STAGE 3A (HCC): Primary | ICD-10-CM

## 2024-08-13 ENCOUNTER — TELEPHONE (OUTPATIENT)
Dept: FAMILY MEDICINE CLINIC | Age: 49
End: 2024-08-13

## 2024-08-13 NOTE — TELEPHONE ENCOUNTER
Salena form [facility] will be providing case management for patient with issues with kidney stones. If you need help coordinating appoints or providing support please call Salena at [phone] ext [phone], thanks.

## 2024-09-11 SDOH — ECONOMIC STABILITY: INCOME INSECURITY: HOW HARD IS IT FOR YOU TO PAY FOR THE VERY BASICS LIKE FOOD, HOUSING, MEDICAL CARE, AND HEATING?: PATIENT DECLINED

## 2024-09-11 SDOH — ECONOMIC STABILITY: FOOD INSECURITY: WITHIN THE PAST 12 MONTHS, YOU WORRIED THAT YOUR FOOD WOULD RUN OUT BEFORE YOU GOT MONEY TO BUY MORE.: PATIENT DECLINED

## 2024-09-11 SDOH — ECONOMIC STABILITY: FOOD INSECURITY: WITHIN THE PAST 12 MONTHS, THE FOOD YOU BOUGHT JUST DIDN'T LAST AND YOU DIDN'T HAVE MONEY TO GET MORE.: PATIENT DECLINED

## 2024-09-11 SDOH — ECONOMIC STABILITY: TRANSPORTATION INSECURITY
IN THE PAST 12 MONTHS, HAS LACK OF TRANSPORTATION KEPT YOU FROM MEETINGS, WORK, OR FROM GETTING THINGS NEEDED FOR DAILY LIVING?: YES

## 2024-09-12 ENCOUNTER — TELEMEDICINE (OUTPATIENT)
Dept: FAMILY MEDICINE CLINIC | Age: 49
End: 2024-09-12

## 2024-09-12 DIAGNOSIS — R47.01 EXPRESSIVE APHASIA: ICD-10-CM

## 2024-09-12 DIAGNOSIS — I10 ESSENTIAL HYPERTENSION, BENIGN: ICD-10-CM

## 2024-09-12 DIAGNOSIS — N18.31 CHRONIC RENAL IMPAIRMENT, STAGE 3A (HCC): ICD-10-CM

## 2024-09-12 DIAGNOSIS — Z86.73 HISTORY OF STROKE: ICD-10-CM

## 2024-09-12 DIAGNOSIS — Q63.1 HORSESHOE KIDNEY: ICD-10-CM

## 2024-09-12 DIAGNOSIS — N20.0 NEPHROLITHIASIS: ICD-10-CM

## 2024-09-12 DIAGNOSIS — R11.2 NAUSEA AND VOMITING, UNSPECIFIED VOMITING TYPE: Primary | ICD-10-CM

## 2024-09-12 DIAGNOSIS — E83.42 HYPOMAGNESEMIA: ICD-10-CM

## 2024-09-12 DIAGNOSIS — N13.5 URETEROPELVIC JUNCTION (UPJ) OBSTRUCTION: ICD-10-CM

## 2024-09-12 DIAGNOSIS — R41.844 EXECUTIVE FUNCTION DEFICIT: ICD-10-CM

## 2024-09-12 RX ORDER — NICOTINE POLACRILEX 4 MG/1
20 GUM, CHEWING ORAL DAILY
Qty: 30 TABLET | Refills: 5 | Status: SHIPPED | OUTPATIENT
Start: 2024-09-12

## 2024-09-27 ENCOUNTER — HOSPITAL ENCOUNTER (OUTPATIENT)
Dept: CT IMAGING | Age: 49
Discharge: HOME | End: 2024-09-27
Attending: NEUROLOGICAL SURGERY
Payer: MEDICARE

## 2024-09-27 DIAGNOSIS — I67.1 CEREBRAL ANEURYSM, NONRUPTURED: ICD-10-CM

## 2024-09-27 PROCEDURE — 6360000004 HC RX CONTRAST MEDICATION: Performed by: NEUROLOGICAL SURGERY

## 2024-09-27 PROCEDURE — 70496 CT ANGIOGRAPHY HEAD: CPT

## 2024-09-27 RX ORDER — IOPAMIDOL 755 MG/ML
75 INJECTION, SOLUTION INTRAVASCULAR
Status: COMPLETED | OUTPATIENT
Start: 2024-09-27 | End: 2024-09-27

## 2024-09-27 RX ADMIN — IOPAMIDOL 75 ML: 755 INJECTION, SOLUTION INTRAVENOUS at 14:26

## 2024-10-21 RX ORDER — CALCITRIOL 0.25 UG/1
CAPSULE, LIQUID FILLED ORAL
Qty: 270 CAPSULE | Refills: 1 | Status: SHIPPED | OUTPATIENT
Start: 2024-10-21

## 2024-10-23 RX ORDER — VERAPAMIL HYDROCHLORIDE 300 MG/1
300 CAPSULE, EXTENDED RELEASE ORAL DAILY
Qty: 90 CAPSULE | Refills: 3 | Status: SHIPPED | OUTPATIENT
Start: 2024-10-23

## 2024-12-11 ENCOUNTER — TRANSCRIBE ORDERS (OUTPATIENT)
Dept: ADMINISTRATIVE | Age: 49
End: 2024-12-11

## 2024-12-11 DIAGNOSIS — N20.0 NEPHROLITHIASIS: Primary | ICD-10-CM

## 2024-12-11 DIAGNOSIS — N13.5 UPJ (URETEROPELVIC JUNCTION) OBSTRUCTION: ICD-10-CM

## 2024-12-19 ENCOUNTER — HOSPITAL ENCOUNTER (OUTPATIENT)
Dept: CT IMAGING | Age: 49
Discharge: HOME | End: 2024-12-19
Payer: MEDICARE

## 2024-12-19 ENCOUNTER — HOSPITAL ENCOUNTER (OUTPATIENT)
Dept: NUCLEAR MEDICINE | Age: 49
Discharge: HOME | End: 2024-12-19
Payer: MEDICARE

## 2024-12-19 DIAGNOSIS — N13.5 UPJ (URETEROPELVIC JUNCTION) OBSTRUCTION: ICD-10-CM

## 2024-12-19 DIAGNOSIS — N20.0 NEPHROLITHIASIS: ICD-10-CM

## 2024-12-19 PROCEDURE — 74176 CT ABD & PELVIS W/O CONTRAST: CPT

## 2024-12-19 PROCEDURE — 78708 K FLOW/FUNCT IMAGE W/DRUG: CPT

## 2024-12-19 PROCEDURE — 3430000000 HC RX DIAGNOSTIC RADIOPHARMACEUTICAL: Performed by: UROLOGY

## 2024-12-19 PROCEDURE — A9562 TC99M MERTIATIDE: HCPCS | Performed by: UROLOGY

## 2024-12-19 RX ORDER — FUROSEMIDE 10 MG/ML
40 INJECTION INTRAMUSCULAR; INTRAVENOUS ONCE
Status: DISCONTINUED | OUTPATIENT
Start: 2024-12-19 | End: 2024-12-20 | Stop reason: HOSPADM

## 2024-12-19 RX ADMIN — TECHNESCAN TC 99M MERTIATIDE 8.11 MILLICURIE: 1 INJECTION, POWDER, LYOPHILIZED, FOR SOLUTION INTRAVENOUS at 15:10

## 2025-01-30 RX ORDER — METOPROLOL SUCCINATE 50 MG/1
TABLET, EXTENDED RELEASE ORAL
Qty: 90 TABLET | Refills: 3 | Status: SHIPPED | OUTPATIENT
Start: 2025-01-30

## 2025-01-31 ENCOUNTER — LAB (OUTPATIENT)
Dept: FAMILY MEDICINE CLINIC | Age: 50
End: 2025-01-31
Payer: MEDICARE

## 2025-01-31 DIAGNOSIS — Z23 NEED FOR SHINGLES VACCINE: Primary | ICD-10-CM

## 2025-01-31 PROCEDURE — 90750 HZV VACC RECOMBINANT IM: CPT | Performed by: FAMILY MEDICINE

## 2025-01-31 PROCEDURE — 90471 IMMUNIZATION ADMIN: CPT | Performed by: FAMILY MEDICINE

## 2025-02-28 ENCOUNTER — PATIENT MESSAGE (OUTPATIENT)
Dept: FAMILY MEDICINE CLINIC | Age: 50
End: 2025-02-28

## 2025-02-28 DIAGNOSIS — R11.2 NAUSEA AND VOMITING, UNSPECIFIED VOMITING TYPE: ICD-10-CM

## 2025-02-28 DIAGNOSIS — N18.31 CHRONIC RENAL IMPAIRMENT, STAGE 3A (HCC): Primary | ICD-10-CM

## 2025-03-03 ENCOUNTER — PATIENT MESSAGE (OUTPATIENT)
Dept: FAMILY MEDICINE CLINIC | Age: 50
End: 2025-03-03

## 2025-03-04 DIAGNOSIS — N18.31 CHRONIC RENAL IMPAIRMENT, STAGE 3A (HCC): ICD-10-CM

## 2025-03-04 DIAGNOSIS — R11.2 NAUSEA AND VOMITING, UNSPECIFIED VOMITING TYPE: ICD-10-CM

## 2025-03-04 LAB
ALBUMIN SERPL-MCNC: 4.4 G/DL (ref 3.4–5)
ALBUMIN/GLOB SERPL: 1.5 {RATIO} (ref 1.1–2.2)
ALP SERPL-CCNC: 76 U/L (ref 40–129)
ALT SERPL-CCNC: 15 U/L (ref 10–40)
ANION GAP SERPL CALCULATED.3IONS-SCNC: 14 MMOL/L (ref 3–16)
AST SERPL-CCNC: 14 U/L (ref 15–37)
BASOPHILS # BLD: 0.1 K/UL (ref 0–0.2)
BASOPHILS NFR BLD: 1 %
BILIRUB SERPL-MCNC: 0.4 MG/DL (ref 0–1)
BUN SERPL-MCNC: 23 MG/DL (ref 7–20)
CALCIUM SERPL-MCNC: 8.3 MG/DL (ref 8.3–10.6)
CHLORIDE SERPL-SCNC: 101 MMOL/L (ref 99–110)
CO2 SERPL-SCNC: 27 MMOL/L (ref 21–32)
CREAT SERPL-MCNC: 1.5 MG/DL (ref 0.6–1.1)
DEPRECATED RDW RBC AUTO: 14 % (ref 12.4–15.4)
EOSINOPHIL # BLD: 0.1 K/UL (ref 0–0.6)
EOSINOPHIL NFR BLD: 1.3 %
GFR SERPLBLD CREATININE-BSD FMLA CKD-EPI: 42 ML/MIN/{1.73_M2}
GLUCOSE SERPL-MCNC: 94 MG/DL (ref 70–99)
HCT VFR BLD AUTO: 42.1 % (ref 36–48)
HGB BLD-MCNC: 14.1 G/DL (ref 12–16)
LYMPHOCYTES # BLD: 1.6 K/UL (ref 1–5.1)
LYMPHOCYTES NFR BLD: 23.7 %
MAGNESIUM SERPL-MCNC: 1.61 MG/DL (ref 1.8–2.4)
MCH RBC QN AUTO: 28.6 PG (ref 26–34)
MCHC RBC AUTO-ENTMCNC: 33.4 G/DL (ref 31–36)
MCV RBC AUTO: 85.7 FL (ref 80–100)
MONOCYTES # BLD: 0.5 K/UL (ref 0–1.3)
MONOCYTES NFR BLD: 7 %
NEUTROPHILS # BLD: 4.5 K/UL (ref 1.7–7.7)
NEUTROPHILS NFR BLD: 67 %
PHOSPHATE SERPL-MCNC: 4.9 MG/DL (ref 2.5–4.9)
PLATELET # BLD AUTO: 196 K/UL (ref 135–450)
PMV BLD AUTO: 10.2 FL (ref 5–10.5)
POTASSIUM SERPL-SCNC: 4.1 MMOL/L (ref 3.5–5.1)
PROT SERPL-MCNC: 7.4 G/DL (ref 6.4–8.2)
RBC # BLD AUTO: 4.92 M/UL (ref 4–5.2)
SODIUM SERPL-SCNC: 142 MMOL/L (ref 136–145)
WBC # BLD AUTO: 6.7 K/UL (ref 4–11)

## 2025-03-11 RX ORDER — CALCITRIOL 0.25 UG/1
CAPSULE, LIQUID FILLED ORAL
Qty: 270 CAPSULE | Refills: 3 | Status: SHIPPED | OUTPATIENT
Start: 2025-03-11

## 2025-04-29 RX ORDER — LEVETIRACETAM 250 MG/1
250 TABLET ORAL DAILY
Qty: 90 TABLET | Refills: 3 | Status: SHIPPED | OUTPATIENT
Start: 2025-04-29

## 2025-05-02 ENCOUNTER — OFFICE VISIT (OUTPATIENT)
Dept: FAMILY MEDICINE CLINIC | Age: 50
End: 2025-05-02

## 2025-05-02 VITALS
OXYGEN SATURATION: 94 % | BODY MASS INDEX: 28.85 KG/M2 | DIASTOLIC BLOOD PRESSURE: 88 MMHG | HEART RATE: 63 BPM | SYSTOLIC BLOOD PRESSURE: 118 MMHG | HEIGHT: 64 IN | WEIGHT: 169 LBS

## 2025-05-02 DIAGNOSIS — E83.51 HYPOCALCEMIA: ICD-10-CM

## 2025-05-02 DIAGNOSIS — R73.9 HYPERGLYCEMIA: ICD-10-CM

## 2025-05-02 DIAGNOSIS — Z00.00 INITIAL MEDICARE ANNUAL WELLNESS VISIT: ICD-10-CM

## 2025-05-02 DIAGNOSIS — N18.31 CHRONIC RENAL IMPAIRMENT, STAGE 3A (HCC): ICD-10-CM

## 2025-05-02 DIAGNOSIS — Z86.73 HISTORY OF STROKE: ICD-10-CM

## 2025-05-02 DIAGNOSIS — I69.90 LATE EFFECTS OF CVA (CEREBROVASCULAR ACCIDENT): ICD-10-CM

## 2025-05-02 DIAGNOSIS — E83.42 HYPOMAGNESEMIA: ICD-10-CM

## 2025-05-02 DIAGNOSIS — Z23 NEED FOR SHINGLES VACCINE: ICD-10-CM

## 2025-05-02 DIAGNOSIS — Z00.00 ROUTINE GENERAL MEDICAL EXAMINATION AT A HEALTH CARE FACILITY: Primary | ICD-10-CM

## 2025-05-02 DIAGNOSIS — R41.844 EXECUTIVE FUNCTION DEFICIT: ICD-10-CM

## 2025-05-02 DIAGNOSIS — E20.9 HYPOPARATHYROIDISM, UNSPECIFIED HYPOPARATHYROIDISM TYPE: ICD-10-CM

## 2025-05-02 DIAGNOSIS — I67.1 CEREBRAL ANEURYSM: ICD-10-CM

## 2025-05-02 DIAGNOSIS — R11.2 NAUSEA AND VOMITING, UNSPECIFIED VOMITING TYPE: ICD-10-CM

## 2025-05-02 DIAGNOSIS — I10 ESSENTIAL HYPERTENSION, BENIGN: ICD-10-CM

## 2025-05-02 DIAGNOSIS — R47.9 SPEECH DEFECT: ICD-10-CM

## 2025-05-02 DIAGNOSIS — R47.01 EXPRESSIVE APHASIA: ICD-10-CM

## 2025-05-02 DIAGNOSIS — Q63.1 HORSESHOE KIDNEY: ICD-10-CM

## 2025-05-02 DIAGNOSIS — R26.81 GAIT INSTABILITY: ICD-10-CM

## 2025-05-02 DIAGNOSIS — R79.0 ABNORMAL LEVEL OF BLOOD MINERAL: ICD-10-CM

## 2025-05-02 DIAGNOSIS — E55.9 VITAMIN D DEFICIENCY: ICD-10-CM

## 2025-05-02 DIAGNOSIS — G91.1 OBSTRUCTIVE HYDROCEPHALUS (HCC): ICD-10-CM

## 2025-05-02 DIAGNOSIS — Z86.79 HISTORY OF SUBARACHNOID HEMORRHAGE: ICD-10-CM

## 2025-05-02 SDOH — ECONOMIC STABILITY: FOOD INSECURITY: WITHIN THE PAST 12 MONTHS, YOU WORRIED THAT YOUR FOOD WOULD RUN OUT BEFORE YOU GOT MONEY TO BUY MORE.: PATIENT DECLINED

## 2025-05-02 SDOH — ECONOMIC STABILITY: FOOD INSECURITY: WITHIN THE PAST 12 MONTHS, THE FOOD YOU BOUGHT JUST DIDN'T LAST AND YOU DIDN'T HAVE MONEY TO GET MORE.: PATIENT DECLINED

## 2025-05-02 ASSESSMENT — PATIENT HEALTH QUESTIONNAIRE - PHQ9
SUM OF ALL RESPONSES TO PHQ QUESTIONS 1-9: 0
1. LITTLE INTEREST OR PLEASURE IN DOING THINGS: NOT AT ALL
2. FEELING DOWN, DEPRESSED OR HOPELESS: NOT AT ALL
SUM OF ALL RESPONSES TO PHQ QUESTIONS 1-9: 0

## 2025-05-02 ASSESSMENT — LIFESTYLE VARIABLES
HOW OFTEN DO YOU HAVE A DRINK CONTAINING ALCOHOL: MONTHLY OR LESS
HOW MANY STANDARD DRINKS CONTAINING ALCOHOL DO YOU HAVE ON A TYPICAL DAY: 1 OR 2

## 2025-05-02 NOTE — PROGRESS NOTES
hazards, visual acuity, and co-morbidities that can increase risk for falls  Referral for care mgt given today    Cognitive:   Clock Drawing Test (CDT): (!) Abnormal  Words recalled: 0 Words Recalled  Total Score: (!) 0  Total Score Interpretation: Abnormal Mini-Cog  Interventions:  Patient declines any further evaluation or treatment          Self-assessment of health:  In general, how would you say your health is?: (!) Poor    Interventions:  Care mgt discussed     Inactivity:  On average, how many days per week do you engage in moderate to strenuous exercise (like a brisk walk)?: 0 days (!) Abnormal  On average, how many minutes do you engage in exercise at this level?: 0 min  Interventions:  Care mgt referral placed        Vision Screen:  Do you have difficulty driving, watching TV, or doing any of your daily activities because of your eyesight?: No  Have you had an eye exam within the past year?: (!) No  Interventions:   Patient encouraged to make appointment with their eye specialist    Safety:  Do you have any tripping hazards - loose or unsecured carpets or rugs?: (!) Yes  Do you have non-slip mats or non-slip surfaces or shower bars or grab bars in your shower or bathtub?: (!) No  Do you always fasten your seatbelt when you are in a car?: (!) No  Interventions:  Home safety discussed with pt,     ADL's:   Patient reports needing help with:  Select all that apply: (!) Eating, Dressing, Grooming, Bathing, Toileting, Walking/Balance  Select all that apply: (!) Laundry, Housekeeping, Banking/Finances, Shopping, Telephone Use, Food Preparation, Transportation, Taking Medications  Interventions:  Chronic d/t hx of CVA  refer care mgt    Advanced Directives:  Do you have a Living Will?: (!) No    Intervention:  has NO advanced directive - not interested in additional information                     Objective   Vitals:    05/02/25 1551   BP: 118/88   Pulse: 63   SpO2: 94%   Weight: 76.7 kg (169 lb)   Height:

## 2025-05-03 ENCOUNTER — RESULTS FOLLOW-UP (OUTPATIENT)
Dept: FAMILY MEDICINE CLINIC | Age: 50
End: 2025-05-03

## 2025-05-03 LAB
25(OH)D3 SERPL-MCNC: 29.6 NG/ML
ALBUMIN SERPL-MCNC: 4.4 G/DL (ref 3.4–5)
ALBUMIN/GLOB SERPL: 1.6 {RATIO} (ref 1.1–2.2)
ALP SERPL-CCNC: 72 U/L (ref 40–129)
ALT SERPL-CCNC: 18 U/L (ref 10–40)
ANION GAP SERPL CALCULATED.3IONS-SCNC: 9 MMOL/L (ref 3–16)
AST SERPL-CCNC: 15 U/L (ref 15–37)
BILIRUB SERPL-MCNC: 0.3 MG/DL (ref 0–1)
BUN SERPL-MCNC: 36 MG/DL (ref 7–20)
CALCIUM SERPL-MCNC: 10.1 MG/DL (ref 8.3–10.6)
CHLORIDE SERPL-SCNC: 101 MMOL/L (ref 99–110)
CO2 SERPL-SCNC: 32 MMOL/L (ref 21–32)
CREAT SERPL-MCNC: 1.7 MG/DL (ref 0.6–1.1)
EST. AVERAGE GLUCOSE BLD GHB EST-MCNC: 119.8 MG/DL
FOLATE SERPL-MCNC: 9.72 NG/ML (ref 4.78–24.2)
GFR SERPLBLD CREATININE-BSD FMLA CKD-EPI: 36 ML/MIN/{1.73_M2}
GLUCOSE SERPL-MCNC: 96 MG/DL (ref 70–99)
HBA1C MFR BLD: 5.8 %
LIPASE SERPL-CCNC: 32 U/L (ref 13–60)
MAGNESIUM SERPL-MCNC: 2.42 MG/DL (ref 1.8–2.4)
PHOSPHATE SERPL-MCNC: 6 MG/DL (ref 2.5–4.9)
POTASSIUM SERPL-SCNC: 4.6 MMOL/L (ref 3.5–5.1)
PROT SERPL-MCNC: 7.2 G/DL (ref 6.4–8.2)
SODIUM SERPL-SCNC: 142 MMOL/L (ref 136–145)
TSH SERPL DL<=0.005 MIU/L-ACNC: 0.75 UIU/ML (ref 0.27–4.2)
VIT B12 SERPL-MCNC: 508 PG/ML (ref 211–911)

## 2025-05-03 NOTE — PATIENT INSTRUCTIONS
call for help?   Call 911 if you have symptoms of a heart attack. These may include:    Chest pain or pressure, or a strange feeling in the chest.     Sweating.     Shortness of breath.     Pain, pressure, or a strange feeling in the back, neck, jaw, or upper belly or in one or both shoulders or arms.     Lightheadedness or sudden weakness.     A fast or irregular heartbeat.   After you call 911, the  may tell you to chew 1 adult-strength or 2 to 4 low-dose aspirin. Wait for an ambulance. Do not try to drive yourself.  Watch closely for changes in your health, and be sure to contact your doctor if you have any problems.  Where can you learn more?  Go to https://www.[employer].net/patientEd and enter F075 to learn more about \"A Healthy Heart: Care Instructions.\"  Current as of: July 31, 2024  Content Version: 14.4  © 4183-7164 [employer].   Care instructions adapted under license by [employer]. If you have questions about a medical condition or this instruction, always ask your healthcare professional. [employer], disclaims any warranty or liability for your use of this information.    Personalized Preventive Plan for Tete Camara - 5/2/2025  Medicare offers a range of preventive health benefits. Some of the tests and screenings are paid in full while other may be subject to a deductible, co-insurance, and/or copay.  Some of these benefits include a comprehensive review of your medical history including lifestyle, illnesses that may run in your family, and various assessments and screenings as appropriate.  After reviewing your medical record and screening and assessments performed today your provider may have ordered immunizations, labs, imaging, and/or referrals for you.  A list of these orders (if applicable) as well as your Preventive Care list are included within your After Visit Summary for your review.

## 2025-05-06 ENCOUNTER — CARE COORDINATION (OUTPATIENT)
Dept: CARE COORDINATION | Age: 50
End: 2025-05-06

## 2025-05-06 NOTE — CARE COORDINATION
Ambulatory Care Coordination Note     5/6/2025 12:42 PM     Patient outreach attempt by this ACM today to offer care management services. ACM was unable to reach the patient by telephone today;   left voice message requesting a return phone call to this ACM.     ACM: Robyn Sanchez RN     Care Summary Note: initiated ACC outreach s/p PCP referral for ACC/RPM.  Left detailed vm, following prompts to self-identify per call-screen feature pt has on vm.  Offered brief overview of ACC support offered via extension of PCP care team, per provider referral.  Reviewed ACM availability Mon-Fri 8am-4pm, inviting pt to advise preferred optimal time/day to be reached if pt reaches this writer's vm on return callback - advised ACM sometimes negotiating high volume outbound calls, but frequently monitors ACM vm & every effort will be made to promptly return callback at pt's advising of optimal time to be reached.    Provided & repeated ACM callback number.    PCP/Specialist follow up:   Future Appointments         Provider Specialty Dept Phone    5/15/2025 12:15 PM Raymon Silver MD Nephrology [phone]            Follow Up:   Plan for next ACM outreach in approximately <1 week to complete:  - outreach attempt to offer care management services.

## 2025-05-07 ENCOUNTER — CARE COORDINATION (OUTPATIENT)
Dept: CARE COORDINATION | Age: 50
End: 2025-05-07

## 2025-05-07 NOTE — CARE COORDINATION
Ambulatory Care Coordination Note     5/7/2025 9:44 AM     Patient outreach attempt by this ACM today to offer care management services. ACM was unable to reach the patient by telephone today;   left voice message requesting a return phone call to this ACM.     ACM: Robyn Sanchez RN     Care Summary Note: cont'd effort to reach to engage for ACC/RPM.  Left follow up vm, reviewing info shared on initial vm left yesterday.  Invited pt to advise preferred/optimal time/day pt best reached.  Provided & repeated ACM callback number.    PCP/Specialist follow up:   Future Appointments         Provider Specialty Dept Phone    5/15/2025 12:15 PM Raymon Silver MD Nephrology [phone]            Follow Up:   Plan for next ACM outreach in approximately <1 week to complete:  FINAL- outreach attempt to offer care management services.

## 2025-05-09 ENCOUNTER — CARE COORDINATION (OUTPATIENT)
Dept: CARE COORDINATION | Age: 50
End: 2025-05-09

## 2025-05-09 NOTE — CARE COORDINATION
Ambulatory Care Coordination Note     5/9/2025 2:49 PM     patient outreach attempt by this AC today to offer care management services. [facility] was unable to reach the patient by telephone today;   left voice message requesting a return phone call to this AC.     Patient closed (unable to reach patient) from the High Risk Care Management program on 5/9/2025.   No further Ambulatory Care Manager follow up scheduled.     Advised this will be final attempt for the current/recent provider referral.  Encouraged individual to review w/PCP if any questions about ACC support offered this campaign.    Extended open invitation for individual to contact this writer for review of ACC, at any time in the future, if interested.  No further outreach planned at this time.    Provided & repeated AC callback number.

## 2025-05-12 ENCOUNTER — CARE COORDINATION (OUTPATIENT)
Dept: CARE COORDINATION | Age: 50
End: 2025-05-12

## 2025-05-12 NOTE — CARE COORDINATION
Living Will ACP-Advance Directive ACP-Power of     Not on File Filed on 04/17/15 Filed Not on File            The patient has appointed the following active healthcare agents: pt/family agree to review with/advise PCP to update HCDM.      The Patient has the following current code status:    Code Status: Prior    Visit Documentation:  I called and spoke with Torito Camara, /cg to Tete Camara regarding Advance Care Planning and Advance Care Directives, including Living Will and Health Care Power of .     I discussed Advance Care Planning (ACP) with Torito Camara, /cg to Tete Camara today which included the patient's choices for care and treatment in the case of a health event that adversely affects decision-making abilities. She stated the Advance Care Directives on file are current. We discussed patient's current values, goals and care preferences at the end of life.  Pt/cg has no questions at this time, but endorses interest to engage for ACP support in updating/ascertaining all ACP in place are appropriate and current. [facility] placed ACP/SW referral     Robyn Sanchez RN  5/12/2025

## 2025-05-14 ENCOUNTER — CARE COORDINATION (OUTPATIENT)
Dept: CARE COORDINATION | Age: 50
End: 2025-05-14

## 2025-05-14 NOTE — CARE COORDINATION
SW received referral from [facility] for assistance with community resource needs. SW placed call to patient's spouse, Torito, to discuss.   Per spouse, he would like to update patient's AD's as they have her maiden name on them. He is agreeable to receive AD packet via mail. SW to send mail request to [facility] on this date.    As noted, spouse is patient's primary caregiver. He is the sole , completes meal prep and homemaking tasks, and helps patient with her medications and MD appointments. Patient can use the microwave, but still needs supervision so as not to overheat food.    Patient has access to a shower chair. Spouse reports that patient can complete personal care tasks, but it could take two hours for her to do them, so he assists her with the process.     Spouse reports she has a respite benefit of 15 days through the [facility] if she ever needed to use it. He has contacted [employer] several years ago, but they did not qualify for Medicaid and thus did not qualify for the [place] Home Care Waiver program. Spouse is agreeable for this worker to make a referral to [employer] to re-assess eligibility. Referral reference #: [id number].  SW explained that aside from [employer] services, available in-home assistance would need to be paid for OOP through private duty care. This is not something they want to pursue at this time.      No further questions at this time. SW to follow up with spouse in two weeks to ensure receipt of AD packet and provide further support regarding [employer] referral/in-home supportive services.     Diana Jerez MSW, LSW     [phone]

## 2025-05-19 ENCOUNTER — CARE COORDINATION (OUTPATIENT)
Dept: CARE COORDINATION | Age: 50
End: 2025-05-19

## 2025-05-19 NOTE — CARE COORDINATION
Ambulatory Care Coordination Note     2025 2:15 PM     Patient Current Location:  Home: [address] [place] [address] [place] [address]     ACM contacted the spouse/partner by telephone. Verified name and  with spouse/partner as identifiers.         ACM: Robyn Sanchez RN     Challenges to be reviewed by the provider   Additional needs identified to be addressed with provider No  none               Method of communication with provider: none.    Utilization: Patient has not had any utilization since our last call.     Care Summary Note: spoke w/Torito, pt's /cg who denies requiring higher frequency of routine ACC outreaches, noting he remains proactively engaged in cg/CM support of his wife (pt). Denies any newly presenting concerns or needs requiring higher frequency. Acknowledges receipt of call from [employer], per  referral, however he missed the call - plans to return outreach to [employer].  Reviewed availability of ACM support, as needed, between routine ACC outreach. Per pt's /cg preference - will call again in 1 month for final ACC review. Torito, pt's /cg maintains belief ongoing ACC likely unnecessary; noting he has therapeutic relationship with providers involved in pt's care & no identified barriers. Denies requiring support for scheduling - plans to self outreach to schedule future/return visit(s) as appropriate.  In absence of identified concerns, and per Torito's preference - will maintain active status for pt in current ACC program for next 30 days. Anticipate graduating pt from ACC in 30 days, which allows sufficient time to pt/family to consider any add'l support needs required from ACC program.    Offered patient enrollment in the Remote Patient Monitoring (RPM) program for in-home monitoring: N/A     Assessments Completed:   Hypertension - Encounter Level    Symptom course: stable      ,   General Assessment    Do you have any symptoms that are causing concern?: No          Medications

## 2025-05-28 ENCOUNTER — CARE COORDINATION (OUTPATIENT)
Dept: CARE COORDINATION | Age: 50
End: 2025-05-28

## 2025-05-28 NOTE — CARE COORDINATION
Attempt to follow up with patient/spouse. Unable to reach. Left detailed message. SW will make another follow up attempt.

## 2025-06-06 ENCOUNTER — CARE COORDINATION (OUTPATIENT)
Dept: CARE COORDINATION | Age: 50
End: 2025-06-06

## 2025-06-06 NOTE — CARE COORDINATION
Attempt to follow up with patient and obtain update on AD forms and [employer] referral. SW will make final attempt.

## 2025-06-17 ENCOUNTER — CARE COORDINATION (OUTPATIENT)
Dept: CARE COORDINATION | Age: 50
End: 2025-06-17

## 2025-06-17 NOTE — CARE COORDINATION
Ambulatory Care Coordination Note     2025 9:54 AM     Patient Current Location:  Home: [address] [place] [address] [place] [address]     ACM contacted the spouse/partner by telephone. Verified name and  with spouse/partner as identifiers.     Patient graduated from the High Risk Care Management program on 2025.  Spouse/Partner verbalizes confidence in the ability to self-manage at this time. has the ability to self-manage at this time. Advised to anticipate outreach by ACC team SW, as notes reflect SW did recently outreach (..), but was unsuccessful in reaching pt/family. Advised that pt/family are encouraged to receive any further outreach by SW - as there may be additional resources/information to be shared.  At this time, as Care management goals have been completed per pt/cg review & understanding of mgmt of care for pt for condition & deny having any further questions or concerns as relates to care mgmt of condition -. No further Ambulatory Care Manager follow up scheduled.      ACM: Robyn Sanchez RN     Challenges to be reviewed by the provider   Additional needs identified to be addressed with provider No  none               Method of communication with provider: none.    Utilization: Patient has not had any utilization since our last call.     Care Summary Note: final ACC outreach by CM nurse. Reviewed that while no further outreach indicated by ACM, appears SW does still plan another attempt - encouraged pt/family to connect w/SW for any further review of resources/information.  Pt's , Torito, verbalizes understanding of above and readiness to conclude further outreach by ACM at this time - optimal outcomes achieved.  Advised pt/family need to schedule future/return visit w/PCP.  Pt/family state preference to self outreach for scheduling at more opportune time.  Offered patient enrollment in the Remote Patient Monitoring (RPM) program for in-home monitoring: Patient is not eligible for

## 2025-06-25 ENCOUNTER — CARE COORDINATION (OUTPATIENT)
Dept: CARE COORDINATION | Age: 50
End: 2025-06-25

## 2025-06-30 ENCOUNTER — PATIENT MESSAGE (OUTPATIENT)
Dept: FAMILY MEDICINE CLINIC | Age: 50
End: 2025-06-30

## 2025-06-30 DIAGNOSIS — N18.31 CHRONIC RENAL IMPAIRMENT, STAGE 3A (HCC): ICD-10-CM

## 2025-06-30 DIAGNOSIS — I10 ESSENTIAL HYPERTENSION, BENIGN: Primary | ICD-10-CM

## 2025-06-30 DIAGNOSIS — R79.0 ABNORMAL LEVEL OF BLOOD MINERAL: ICD-10-CM

## 2025-08-01 DIAGNOSIS — I10 ESSENTIAL HYPERTENSION, BENIGN: ICD-10-CM

## 2025-08-01 DIAGNOSIS — N18.31 CHRONIC RENAL IMPAIRMENT, STAGE 3A (HCC): ICD-10-CM

## 2025-08-01 DIAGNOSIS — R79.0 ABNORMAL LEVEL OF BLOOD MINERAL: ICD-10-CM

## 2025-08-02 LAB
25(OH)D3 SERPL-MCNC: 34.2 NG/ML
ANION GAP SERPL CALCULATED.3IONS-SCNC: 11 MMOL/L (ref 3–16)
BUN SERPL-MCNC: 41 MG/DL (ref 7–20)
CALCIUM SERPL-MCNC: 10.8 MG/DL (ref 8.3–10.6)
CHLORIDE SERPL-SCNC: 101 MMOL/L (ref 99–110)
CO2 SERPL-SCNC: 29 MMOL/L (ref 21–32)
CREAT SERPL-MCNC: 2.2 MG/DL (ref 0.6–1.1)
GFR SERPLBLD CREATININE-BSD FMLA CKD-EPI: 26 ML/MIN/{1.73_M2}
GLUCOSE SERPL-MCNC: 91 MG/DL (ref 70–99)
MAGNESIUM SERPL-MCNC: 2.17 MG/DL (ref 1.8–2.4)
POTASSIUM SERPL-SCNC: 4.4 MMOL/L (ref 3.5–5.1)
SODIUM SERPL-SCNC: 141 MMOL/L (ref 136–145)

## 2025-08-29 ENCOUNTER — TRANSCRIBE ORDERS (OUTPATIENT)
Dept: ADMINISTRATIVE | Age: 50
End: 2025-08-29

## 2025-08-29 DIAGNOSIS — N20.0 NEPHROLITHIASIS: ICD-10-CM

## 2025-08-29 DIAGNOSIS — N13.5 UPJ (URETEROPELVIC JUNCTION) OBSTRUCTION: Primary | ICD-10-CM
